# Patient Record
Sex: MALE | Race: WHITE | Employment: FULL TIME | URBAN - NONMETROPOLITAN AREA
[De-identification: names, ages, dates, MRNs, and addresses within clinical notes are randomized per-mention and may not be internally consistent; named-entity substitution may affect disease eponyms.]

---

## 2021-03-02 ENCOUNTER — OFFICE VISIT (OUTPATIENT)
Dept: FAMILY MEDICINE CLINIC | Age: 46
End: 2021-03-02
Payer: MEDICAID

## 2021-03-02 VITALS
DIASTOLIC BLOOD PRESSURE: 78 MMHG | SYSTOLIC BLOOD PRESSURE: 125 MMHG | HEART RATE: 70 BPM | TEMPERATURE: 97.6 F | OXYGEN SATURATION: 98 % | WEIGHT: 240 LBS

## 2021-03-02 DIAGNOSIS — R11.0 NAUSEA: Primary | ICD-10-CM

## 2021-03-02 DIAGNOSIS — J34.89 RHINORRHEA: ICD-10-CM

## 2021-03-02 DIAGNOSIS — R68.83 CHILLS: ICD-10-CM

## 2021-03-02 DIAGNOSIS — R51.9 ACUTE NONINTRACTABLE HEADACHE, UNSPECIFIED HEADACHE TYPE: ICD-10-CM

## 2021-03-02 DIAGNOSIS — R52 GENERALIZED BODY ACHES: ICD-10-CM

## 2021-03-02 DIAGNOSIS — R53.83 FATIGUE, UNSPECIFIED TYPE: ICD-10-CM

## 2021-03-02 PROCEDURE — 4004F PT TOBACCO SCREEN RCVD TLK: CPT | Performed by: NURSE PRACTITIONER

## 2021-03-02 PROCEDURE — 99213 OFFICE O/P EST LOW 20 MIN: CPT | Performed by: NURSE PRACTITIONER

## 2021-03-02 PROCEDURE — G8427 DOCREV CUR MEDS BY ELIG CLIN: HCPCS | Performed by: NURSE PRACTITIONER

## 2021-03-02 PROCEDURE — G8421 BMI NOT CALCULATED: HCPCS | Performed by: NURSE PRACTITIONER

## 2021-03-02 PROCEDURE — G8484 FLU IMMUNIZE NO ADMIN: HCPCS | Performed by: NURSE PRACTITIONER

## 2021-03-02 RX ORDER — MIRTAZAPINE 15 MG/1
TABLET, FILM COATED ORAL
COMMUNITY
Start: 2021-02-04 | End: 2021-06-23

## 2021-03-02 RX ORDER — ONDANSETRON 4 MG/1
4 TABLET, ORALLY DISINTEGRATING ORAL 3 TIMES DAILY PRN
Qty: 21 TABLET | Refills: 0 | Status: SHIPPED
Start: 2021-03-02 | End: 2021-06-23

## 2021-03-02 RX ORDER — ERGOCALCIFEROL 1.25 MG/1
CAPSULE ORAL
COMMUNITY
Start: 2021-02-09 | End: 2021-06-23

## 2021-03-02 SDOH — HEALTH STABILITY: MENTAL HEALTH: HOW OFTEN DO YOU HAVE A DRINK CONTAINING ALCOHOL?: NEVER

## 2021-03-02 SDOH — HEALTH STABILITY: MENTAL HEALTH: HOW MANY STANDARD DRINKS CONTAINING ALCOHOL DO YOU HAVE ON A TYPICAL DAY?: NOT ASKED

## 2021-03-02 NOTE — PROGRESS NOTES
Chief Complaint   Nausea (today )      History of Present Illness   Source of history provided by:  patient. Isi Almonte is a 54 y.o. old male who presents to the walk-in clinic with complaints of Chills, Generalized Body Aches, Headache, Rhinorrhea, Nasal Congestion, Post nasal drip, Nausea and Fatigue x 1 days. States symptoms have stayed the same since onset. Has been taking no OTC medications. Denies any Fever, Cough, Shortness of breath, Vomiting, Chest Pain, LE Edema, Abdominal Pain or Rash. Denies any hx of asthma, COPD, emphysema or pneumonia. ROS   Pertinent positives and negatives are stated within HPI, all other systems reviewed and are negative. Past Medical History:  has no past medical history on file. Past Surgical History:  has no past surgical history on file. Social History:  reports that he has been smoking. He has never used smokeless tobacco. He reports previous alcohol use. He reports current drug use. Drug: Marijuana. Family History: family history is not on file. Allergies: Penicillins    Physical Exam   Vital Signs:  /78   Pulse 70   Temp 97.6 °F (36.4 °C) (Temporal)   Wt 240 lb (108.9 kg)   SpO2 98%    Oxygen Saturation Interpretation: Normal.    Constitutional:  Alert, development consistent with age. NAD. Head:  NC/NT. Airway patent. Ears: TMs clear bilaterally. Canals without exudate or swelling bilaterally. Mouth: Posterior pharynx with mild erythema and clear postnasal drip. No tonsillar hypertrophy or exudate. Neck:  Normal ROM. Supple. No anterior cervical adenopathy noted. Lungs: CTAB without wheezes, rales, or rhonchi. CV:  Regular rate and rhythm, normal heart sounds, without pathological murmurs, ectopy, gallops, or rubs. Skin:  Normal turgor. Warm, dry, without visible rash. Lymphatic: No lymphangitis or adenopathy noted. Neurological:  Oriented. Motor functions intact.     Lab / Imaging Results   (All laboratory and radiology results have been personally reviewed by myself)  Labs:  No results found for this visit on 03/02/21. Imaging: All Radiology results interpreted by Radiologist unless otherwise noted. No results found. Medical Decision Making   Pt non-toxic, in no apparent distress and stable at time of discharge. Assessment/Plan   Brigida Lu was seen today for nausea. Diagnoses and all orders for this visit:    Nausea  -     ondansetron (ZOFRAN-ODT) 4 MG disintegrating tablet; Take 1 tablet by mouth 3 times daily as needed for Nausea or Vomiting    Chills    Generalized body aches    Acute nonintractable headache, unspecified headache type    Rhinorrhea    Fatigue, unspecified type        Work excuse provided to patient today to return to work on 3/4/2021. Increase fluids and rest. Symptomatic relief discussed including Tylenol prn pain/fever. Schedule f/u with PCP in 7-10 days if symptoms persist. ED sooner if symptoms worsen or change. ED immediately with high or refractory fever, progressive SOB, dyspnea, CP, calf pain/swelling, shaking chills, vomiting, abdominal pain, lethargy, flank pain, or decreased urinary output. Pt verbalizes understanding and is in agreement with plan of care. All questions answered. Arbie Holstein, APRN - NP    This visit was provided as a focused evaluation during the [place] -19 pandemic/national emergency. A comprehensive review of all previous patient history and testing was not conducted. Pertinent findings were elicited during the visit. *NOTE: This report was transcribed using voice recognition software. Every effort was made to ensure accuracy; however, inadvertent computerized transcription errors may be present.

## 2021-03-02 NOTE — LETTER
[address]  Phone: [phone]  Fax: [phone]    ADRIA Ríos NP        March 2, 2021     Patient: Diana Cedillo   YOB: 1975   Date of Visit: 3/2/2021       To Whom It May Concern: It is my medical opinion that Diana Cedillo may return to work on 03/04/2021. If you have any questions or concerns, please don't hesitate to call.     Sincerely,        ADRIA Ríos NP

## 2021-06-23 ENCOUNTER — OFFICE VISIT (OUTPATIENT)
Dept: FAMILY MEDICINE CLINIC | Age: 46
End: 2021-06-23
Payer: MEDICAID

## 2021-06-23 VITALS
SYSTOLIC BLOOD PRESSURE: 140 MMHG | WEIGHT: 237 LBS | HEART RATE: 63 BPM | DIASTOLIC BLOOD PRESSURE: 80 MMHG | TEMPERATURE: 97.8 F | OXYGEN SATURATION: 98 %

## 2021-06-23 DIAGNOSIS — Z48.00 DRESSING CHANGE: ICD-10-CM

## 2021-06-23 DIAGNOSIS — Z51.89 VISIT FOR WOUND CHECK: Primary | ICD-10-CM

## 2021-06-23 PROCEDURE — 99213 OFFICE O/P EST LOW 20 MIN: CPT | Performed by: PHYSICIAN ASSISTANT

## 2021-06-23 PROCEDURE — G8427 DOCREV CUR MEDS BY ELIG CLIN: HCPCS | Performed by: PHYSICIAN ASSISTANT

## 2021-06-23 PROCEDURE — G8421 BMI NOT CALCULATED: HCPCS | Performed by: PHYSICIAN ASSISTANT

## 2021-06-23 PROCEDURE — 4004F PT TOBACCO SCREEN RCVD TLK: CPT | Performed by: PHYSICIAN ASSISTANT

## 2021-06-23 ASSESSMENT — ENCOUNTER SYMPTOMS
NAUSEA: 0
DIARRHEA: 0
SHORTNESS OF BREATH: 0
BACK PAIN: 0
ABDOMINAL PAIN: 0
VOMITING: 0
PHOTOPHOBIA: 0
COUGH: 0
SORE THROAT: 0

## 2021-06-23 NOTE — PROGRESS NOTES
21  Cherise Rollins : 1975 Sex: male  Age 54 y.o. Subjective:  Chief Complaint   Patient presents with    Wound Infection     stitches on  infected middle left          70-year-old male presents to the walk-in clinic for a wound check. Patient was at work on 2021 when he cut his left middle finger with a sharp knife. Patient went to [facility] and filed for a [facility]. First report of injury was filled out there. Patient had stitches placed to the distal tip of his left finger. Patient was told to return to work on 2021. Patient states his next shift is today at 4:00 PM but he wanted to have a wound check before returning. Patient states that when they changed the bandage today they noticed some bloody drainage from the area. He was not placed on any antibiotics at the time of suture placement. He has had a tetanus within 5 years. Patient is right-hand dominant. He denies any fever, chills, nausea or vomiting. Review of Systems   Constitutional: Negative for chills and fever. HENT: Negative for congestion, ear pain and sore throat. Eyes: Negative for photophobia and visual disturbance. Respiratory: Negative for cough and shortness of breath. Cardiovascular: Negative for chest pain. Gastrointestinal: Negative for abdominal pain, diarrhea, nausea and vomiting. Genitourinary: Negative for difficulty urinating, dysuria, frequency and urgency. Musculoskeletal: Negative for back pain, neck pain and neck stiffness. Skin: Positive for wound. Negative for rash. Neurological: Negative for dizziness, syncope, weakness, light-headedness and headaches. Hematological: Negative for adenopathy. Does not bruise/bleed easily. Psychiatric/Behavioral: Negative for agitation and confusion. All other systems reviewed and are negative. PMH:   History reviewed. No pertinent past medical history. History reviewed.  No pertinent surgical

## 2021-06-26 ENCOUNTER — HOSPITAL ENCOUNTER (EMERGENCY)
Age: 46
Discharge: HOME | End: 2021-06-26
Payer: MEDICAID

## 2021-06-26 VITALS
BODY MASS INDEX: 30.88 KG/M2 | HEART RATE: 70 BPM | DIASTOLIC BLOOD PRESSURE: 75 MMHG | HEIGHT: 72 IN | RESPIRATION RATE: 18 BRPM | OXYGEN SATURATION: 97 % | WEIGHT: 228 LBS | SYSTOLIC BLOOD PRESSURE: 136 MMHG | TEMPERATURE: 97.7 F

## 2021-06-26 DIAGNOSIS — R21 RASH AND OTHER NONSPECIFIC SKIN ERUPTION: Primary | ICD-10-CM

## 2021-06-26 PROCEDURE — 99283 EMERGENCY DEPT VISIT LOW MDM: CPT

## 2021-06-26 RX ORDER — DIAPER,BRIEF,INFANT-TODD,DISP
EACH MISCELLANEOUS
Qty: 1 TUBE | Refills: 1 | Status: SHIPPED | OUTPATIENT
Start: 2021-06-26 | End: 2021-07-03

## 2021-07-04 ENCOUNTER — APPOINTMENT (OUTPATIENT)
Dept: CT IMAGING | Age: 46
End: 2021-07-04
Payer: MEDICAID

## 2021-07-04 ENCOUNTER — HOSPITAL ENCOUNTER (EMERGENCY)
Age: 46
Discharge: HOME | End: 2021-07-04
Attending: EMERGENCY MEDICINE
Payer: MEDICAID

## 2021-07-04 VITALS
HEIGHT: 72 IN | TEMPERATURE: 97.8 F | HEART RATE: 62 BPM | OXYGEN SATURATION: 99 % | WEIGHT: 228 LBS | RESPIRATION RATE: 16 BRPM | SYSTOLIC BLOOD PRESSURE: 132 MMHG | BODY MASS INDEX: 30.88 KG/M2 | DIASTOLIC BLOOD PRESSURE: 66 MMHG

## 2021-07-04 DIAGNOSIS — R51.9 ACUTE NONINTRACTABLE HEADACHE, UNSPECIFIED HEADACHE TYPE: Primary | ICD-10-CM

## 2021-07-04 LAB
ANION GAP SERPL CALCULATED.3IONS-SCNC: 6 MMOL/L (ref 7–16)
BASOPHILS ABSOLUTE: 0.08 E9/L (ref 0–0.2)
BASOPHILS RELATIVE PERCENT: 0.8 % (ref 0–2)
BUN BLDV-MCNC: 14 MG/DL (ref 6–20)
CALCIUM SERPL-MCNC: 9 MG/DL (ref 8.6–10.2)
CHLORIDE BLD-SCNC: 103 MMOL/L (ref 98–107)
CO2: 28 MMOL/L (ref 22–29)
CREAT SERPL-MCNC: 0.8 MG/DL (ref 0.7–1.2)
EOSINOPHILS ABSOLUTE: 0.27 E9/L (ref 0.05–0.5)
EOSINOPHILS RELATIVE PERCENT: 2.7 % (ref 0–6)
GFR AFRICAN AMERICAN: >60
GFR NON-AFRICAN AMERICAN: >60 ML/MIN/1.73
GLUCOSE BLD-MCNC: 108 MG/DL (ref 74–99)
HCT VFR BLD CALC: 41.5 % (ref 37–54)
HEMOGLOBIN: 14.2 G/DL (ref 12.5–16.5)
IMMATURE GRANULOCYTES #: 0.07 E9/L
IMMATURE GRANULOCYTES %: 0.7 % (ref 0–5)
LYMPHOCYTES ABSOLUTE: 2.86 E9/L (ref 1.5–4)
LYMPHOCYTES RELATIVE PERCENT: 28.7 % (ref 20–42)
MCH RBC QN AUTO: 31.7 PG (ref 26–35)
MCHC RBC AUTO-ENTMCNC: 34.2 % (ref 32–34.5)
MCV RBC AUTO: 92.6 FL (ref 80–99.9)
MONOCYTES ABSOLUTE: 0.73 E9/L (ref 0.1–0.95)
MONOCYTES RELATIVE PERCENT: 7.3 % (ref 2–12)
NEUTROPHILS ABSOLUTE: 5.95 E9/L (ref 1.8–7.3)
NEUTROPHILS RELATIVE PERCENT: 59.8 % (ref 43–80)
PDW BLD-RTO: 12.4 FL (ref 11.5–15)
PLATELET # BLD: 192 E9/L (ref 130–450)
PMV BLD AUTO: 10.5 FL (ref 7–12)
POTASSIUM REFLEX MAGNESIUM: 4 MMOL/L (ref 3.5–5)
RBC # BLD: 4.48 E12/L (ref 3.8–5.8)
SODIUM BLD-SCNC: 137 MMOL/L (ref 132–146)
WBC # BLD: 10 E9/L (ref 4.5–11.5)

## 2021-07-04 PROCEDURE — 96374 THER/PROPH/DIAG INJ IV PUSH: CPT

## 2021-07-04 PROCEDURE — 6360000002 HC RX W HCPCS: Performed by: STUDENT IN AN ORGANIZED HEALTH CARE EDUCATION/TRAINING PROGRAM

## 2021-07-04 PROCEDURE — 70496 CT ANGIOGRAPHY HEAD: CPT

## 2021-07-04 PROCEDURE — 99284 EMERGENCY DEPT VISIT MOD MDM: CPT

## 2021-07-04 PROCEDURE — 6370000000 HC RX 637 (ALT 250 FOR IP): Performed by: STUDENT IN AN ORGANIZED HEALTH CARE EDUCATION/TRAINING PROGRAM

## 2021-07-04 PROCEDURE — 85025 COMPLETE CBC W/AUTO DIFF WBC: CPT

## 2021-07-04 PROCEDURE — 80048 BASIC METABOLIC PNL TOTAL CA: CPT

## 2021-07-04 PROCEDURE — 96375 TX/PRO/DX INJ NEW DRUG ADDON: CPT

## 2021-07-04 PROCEDURE — 2580000003 HC RX 258: Performed by: STUDENT IN AN ORGANIZED HEALTH CARE EDUCATION/TRAINING PROGRAM

## 2021-07-04 PROCEDURE — 70498 CT ANGIOGRAPHY NECK: CPT

## 2021-07-04 PROCEDURE — 6360000004 HC RX CONTRAST MEDICATION: Performed by: RADIOLOGY

## 2021-07-04 PROCEDURE — 2580000003 HC RX 258: Performed by: RADIOLOGY

## 2021-07-04 RX ORDER — SODIUM CHLORIDE 0.9 % (FLUSH) 0.9 %
10 SYRINGE (ML) INJECTION PRN
Status: COMPLETED | OUTPATIENT
Start: 2021-07-04 | End: 2021-07-04

## 2021-07-04 RX ORDER — DIPHENHYDRAMINE HYDROCHLORIDE 50 MG/ML
25 INJECTION INTRAMUSCULAR; INTRAVENOUS ONCE
Status: COMPLETED | OUTPATIENT
Start: 2021-07-04 | End: 2021-07-04

## 2021-07-04 RX ORDER — KETOROLAC TROMETHAMINE 30 MG/ML
15 INJECTION, SOLUTION INTRAMUSCULAR; INTRAVENOUS ONCE
Status: COMPLETED | OUTPATIENT
Start: 2021-07-04 | End: 2021-07-04

## 2021-07-04 RX ORDER — ACETAMINOPHEN 325 MG/1
650 TABLET ORAL ONCE
Status: COMPLETED | OUTPATIENT
Start: 2021-07-04 | End: 2021-07-04

## 2021-07-04 RX ORDER — FENTANYL CITRATE 50 UG/ML
25 INJECTION, SOLUTION INTRAMUSCULAR; INTRAVENOUS ONCE
Status: COMPLETED | OUTPATIENT
Start: 2021-07-04 | End: 2021-07-04

## 2021-07-04 RX ORDER — 0.9 % SODIUM CHLORIDE 0.9 %
1000 INTRAVENOUS SOLUTION INTRAVENOUS ONCE
Status: COMPLETED | OUTPATIENT
Start: 2021-07-04 | End: 2021-07-04

## 2021-07-04 RX ORDER — METOCLOPRAMIDE HYDROCHLORIDE 5 MG/ML
10 INJECTION INTRAMUSCULAR; INTRAVENOUS ONCE
Status: COMPLETED | OUTPATIENT
Start: 2021-07-04 | End: 2021-07-04

## 2021-07-04 RX ADMIN — Medication 10 ML: at 16:37

## 2021-07-04 RX ADMIN — METOCLOPRAMIDE 10 MG: 5 INJECTION, SOLUTION INTRAMUSCULAR; INTRAVENOUS at 15:52

## 2021-07-04 RX ADMIN — FENTANYL CITRATE 25 MCG: 50 INJECTION, SOLUTION INTRAMUSCULAR; INTRAVENOUS at 15:52

## 2021-07-04 RX ADMIN — IOPAMIDOL 75 ML: 755 INJECTION, SOLUTION INTRAVENOUS at 16:42

## 2021-07-04 RX ADMIN — SODIUM CHLORIDE 1000 ML: 9 INJECTION, SOLUTION INTRAVENOUS at 15:51

## 2021-07-04 RX ADMIN — ACETAMINOPHEN 650 MG: 325 TABLET ORAL at 15:52

## 2021-07-04 RX ADMIN — KETOROLAC TROMETHAMINE 15 MG: 30 INJECTION, SOLUTION INTRAMUSCULAR; INTRAVENOUS at 18:12

## 2021-07-04 RX ADMIN — DIPHENHYDRAMINE HYDROCHLORIDE 25 MG: 50 INJECTION INTRAMUSCULAR; INTRAVENOUS at 15:52

## 2021-07-04 ASSESSMENT — PAIN - FUNCTIONAL ASSESSMENT: PAIN_FUNCTIONAL_ASSESSMENT: PREVENTS OR INTERFERES SOME ACTIVE ACTIVITIES AND ADLS

## 2021-07-04 ASSESSMENT — PAIN DESCRIPTION - LOCATION
LOCATION: HEAD
LOCATION: HEAD

## 2021-07-04 ASSESSMENT — ENCOUNTER SYMPTOMS
COUGH: 0
VOMITING: 0
APNEA: 0
TROUBLE SWALLOWING: 0
SORE THROAT: 0
WHEEZING: 0
SHORTNESS OF BREATH: 0
RHINORRHEA: 0
DIARRHEA: 0
EYE PAIN: 0
BACK PAIN: 0
NAUSEA: 0
CONSTIPATION: 0
EYE REDNESS: 0
ABDOMINAL PAIN: 0
CHEST TIGHTNESS: 0
PHOTOPHOBIA: 0

## 2021-07-04 ASSESSMENT — PAIN DESCRIPTION - DESCRIPTORS: DESCRIPTORS: SHARP

## 2021-07-04 ASSESSMENT — PAIN DESCRIPTION - FREQUENCY: FREQUENCY: INTERMITTENT

## 2021-07-04 ASSESSMENT — PAIN SCALES - GENERAL
PAINLEVEL_OUTOF10: 9
PAINLEVEL_OUTOF10: 5
PAINLEVEL_OUTOF10: 9
PAINLEVEL_OUTOF10: 5

## 2021-07-04 ASSESSMENT — PAIN DESCRIPTION - ONSET: ONSET: SUDDEN

## 2021-07-04 ASSESSMENT — PAIN DESCRIPTION - PAIN TYPE
TYPE: ACUTE PAIN
TYPE: ACUTE PAIN

## 2021-07-04 NOTE — ED PROVIDER NOTES
[facility] ENCOUNTER      Pt Name: Loretta Asher  MRN: [id number]  [place] 1975  Date of evaluation: 7/4/2021      CHIEF COMPLAINT       Chief Complaint   Patient presents with    Headache     onset last night after shower        HPI  Loretta Asher is a 51 y.o. male who presents to the emergency department with complaints of headache. The patient states that last night at around 3 AM he was taking shower he began to have a headache. He does not think he hit his head. Describes the symptoms as mild to moderate. His wife states she feels the mild amount of swelling in the back of his head. He states he slept and when he woke up continue to have a headache. He denies any neck pain, vision changes, weakness numbness or tingling. Is never had a headache like this before. Denies any blood thinners. Denies any fevers, chills, neck pain, shortness of breath or chest pain. Except as noted above the remainder of the review of systems was reviewed and negative. Review of Systems   Constitutional: Negative for activity change, chills, diaphoresis, fatigue and fever. HENT: Negative for rhinorrhea, sore throat and trouble swallowing. Eyes: Negative for photophobia, pain and redness. Respiratory: Negative for apnea, cough, chest tightness, shortness of breath and wheezing. Cardiovascular: Negative for chest pain, palpitations and leg swelling. Gastrointestinal: Negative for abdominal pain, constipation, diarrhea, nausea and vomiting. Endocrine: Negative for polyuria. Genitourinary: Negative for difficulty urinating and dysuria. Musculoskeletal: Negative for back pain, neck pain and neck stiffness. Skin: Negative for pallor and rash. Neurological: Positive for headaches. Negative for dizziness, syncope, weakness, light-headedness and numbness. Psychiatric/Behavioral: Negative for confusion.  The patient is not nervous/anxious. Physical Exam  Vitals and nursing note reviewed. Constitutional:       General: He is not in acute distress. Appearance: He is well-developed. Comments: Awake and alert. Sitting in the gurney in no obvious distress. HENT:      Head: Normocephalic. Comments: Mild swelling to the occiput. Head otherwise atraumatic. Mouth/Throat:      Mouth: Mucous membranes are moist.      Pharynx: No oropharyngeal exudate. Eyes:      General: No scleral icterus. Pupils: Pupils are equal, round, and reactive to light. Cardiovascular:      Rate and Rhythm: Normal rate and regular rhythm. Heart sounds: Normal heart sounds. No murmur heard. Comments: 2+ radial and dorsal pedis pulses bilaterally  Pulmonary:      Effort: Pulmonary effort is normal. No respiratory distress. Breath sounds: Normal breath sounds. No wheezing. Abdominal:      Palpations: Abdomen is soft. Tenderness: There is no abdominal tenderness. There is no guarding or rebound. Musculoskeletal:         General: No tenderness or deformity. Normal range of motion. Cervical back: Normal range of motion and neck supple. Right lower leg: No edema. Left lower leg: No edema. Skin:     General: Skin is warm and dry. Capillary Refill: Capillary refill takes less than 2 seconds. Findings: No rash. Neurological:      General: No focal deficit present. Mental Status: He is alert and oriented to person, place, and time. Cranial Nerves: No cranial nerve deficit. Sensory: No sensory deficit. Motor: No weakness or abnormal muscle tone. Psychiatric:         Mood and Affect: Mood normal.         Behavior: Behavior normal.          Procedures     MDM   This is a 79-year-old male who presents to the emergency department with chief complaint of headache.   In the emergency department the patient is awake and alert, hemodynamically stable, afebrile and in no respiratory distress. Ambulating without difficulty. Neurologic exam shows no focal deficits. CT imaging, CTA head and neck showed no acute intracranial pathology. No signs of subarachnoid hemorrhage. Patient feeling much better after his part of therapy in the ED. No meningeal signs or signs of meningitis. Patient does have a small hematoma to possible region tender to palpation reproducing his pain. Unclear if he had contusion to the head because patient does not remember if he did in fact hit his head. No other concerning signs or symptoms. Patient feeling much better ambulating without difficulty. Discussed plan for discharge home as well as return precautions patient understands and agrees to the plan.                    --------------------------------------------- PAST HISTORY ---------------------------------------------  Past Medical History:  has no past medical history on file. Past Surgical History:  has no past surgical history on file. Social History:  reports that he has been smoking. He has never used smokeless tobacco. He reports previous alcohol use. He reports current drug use. Drug: Marijuana. Family History: family history is not on file. The patients home medications have been reviewed.     Allergies: Penicillins    -------------------------------------------------- RESULTS -------------------------------------------------  Labs:  Results for orders placed or performed during the hospital encounter of 07/04/21   CBC Auto Differential   Result Value Ref Range    WBC 10.0 4.5 - 11.5 E9/L    RBC 4.48 3.80 - 5.80 E12/L    Hemoglobin 14.2 12.5 - 16.5 g/dL    Hematocrit 41.5 37.0 - 54.0 %    MCV 92.6 80.0 - 99.9 fL    MCH 31.7 26.0 - 35.0 pg    MCHC 34.2 32.0 - 34.5 %    RDW 12.4 11.5 - 15.0 fL    Platelets 782 610 - 062 E9/L    MPV 10.5 7.0 - 12.0 fL    Neutrophils % 59.8 43.0 - 80.0 %    Immature Granulocytes % 0.7 0.0 - 5.0 %    Lymphocytes % 28.7 20.0 - 42.0 % ---------------------------  Date / Time Roomed:  7/4/2021  3:18 PM  ED Bed Assignment:  12/12    The nursing notes within the ED encounter and vital signs as below have been reviewed. /66   Pulse 62   Temp 97.8 °F (36.6 °C) (Temporal)   Resp 16   Ht 6' (1.829 m)   Wt 228 lb (103.4 kg)   SpO2 99%   BMI 30.92 kg/m²   Oxygen Saturation Interpretation: Normal      ------------------------------------------ PROGRESS NOTES ------------------------------------------    I have spoken with the patient and discussed todays results, in addition to providing specific details for the plan of care and counseling regarding the diagnosis and prognosis. Their questions are answered at this time and they are agreeable with the plan. I discussed at length with them reasons for immediate return here for re evaluation. They will followup with their primary care physician by calling their office tomorrow. --------------------------------- ADDITIONAL PROVIDER NOTES ---------------------------------  At this time the patient is without objective evidence of an acute process requiring hospitalization or inpatient management. They have remained hemodynamically stable throughout their entire ED visit and are stable for discharge with outpatient follow-up. The plan has been discussed in detail and they are aware of the specific conditions for emergent return, as well as the importance of follow-up. There are no discharge medications for this patient. Diagnosis:  1. Acute nonintractable headache, unspecified headache type        Disposition:  Patient's disposition: Discharge to home  Patient's condition is stable.        Patric Dance, DO  Resident  07/04/21 2011

## 2021-07-06 ENCOUNTER — OFFICE VISIT (OUTPATIENT)
Dept: FAMILY MEDICINE CLINIC | Age: 46
End: 2021-07-06
Payer: MEDICAID

## 2021-07-06 VITALS
HEART RATE: 83 BPM | SYSTOLIC BLOOD PRESSURE: 128 MMHG | WEIGHT: 238 LBS | TEMPERATURE: 97.7 F | BODY MASS INDEX: 32.28 KG/M2 | OXYGEN SATURATION: 98 % | DIASTOLIC BLOOD PRESSURE: 80 MMHG

## 2021-07-06 DIAGNOSIS — Z48.02 VISIT FOR SUTURE REMOVAL: Primary | ICD-10-CM

## 2021-07-06 PROCEDURE — 4004F PT TOBACCO SCREEN RCVD TLK: CPT | Performed by: PHYSICIAN ASSISTANT

## 2021-07-06 PROCEDURE — G8419 CALC BMI OUT NRM PARAM NOF/U: HCPCS | Performed by: PHYSICIAN ASSISTANT

## 2021-07-06 PROCEDURE — G8427 DOCREV CUR MEDS BY ELIG CLIN: HCPCS | Performed by: PHYSICIAN ASSISTANT

## 2021-07-06 PROCEDURE — 99213 OFFICE O/P EST LOW 20 MIN: CPT | Performed by: PHYSICIAN ASSISTANT

## 2021-07-06 ASSESSMENT — ENCOUNTER SYMPTOMS
DIARRHEA: 0
BACK PAIN: 0
COUGH: 0
VOMITING: 0
ROS SKIN COMMENTS: SUTURE REMOVAL
PHOTOPHOBIA: 0
SHORTNESS OF BREATH: 0
SORE THROAT: 0
ABDOMINAL PAIN: 0
NAUSEA: 0

## 2021-07-06 NOTE — PROGRESS NOTES
21  Radha Womack : 1975 Sex: male  Age 51 y.o. Subjective:  Chief Complaint   Patient presents with    Suture / Staple Removal     middle left finger          79-year-old male presents to the walk-in clinic for suture removal.  Patient had 4 simple interrupted sutures placed to the distal tip of the left middle finger on 2020 after sustaining an injury with a knife at work. Patient reports no redness or discharge or drainage from the area. Review of Systems   Constitutional: Negative for chills and fever. HENT: Negative for congestion, ear pain and sore throat. Eyes: Negative for photophobia and visual disturbance. Respiratory: Negative for cough and shortness of breath. Cardiovascular: Negative for chest pain. Gastrointestinal: Negative for abdominal pain, diarrhea, nausea and vomiting. Genitourinary: Negative for difficulty urinating, dysuria, frequency and urgency. Musculoskeletal: Negative for back pain, neck pain and neck stiffness. Skin: Negative for rash. Suture removal   Neurological: Negative for dizziness, syncope, weakness, light-headedness and headaches. Hematological: Negative for adenopathy. Does not bruise/bleed easily. Psychiatric/Behavioral: Negative for agitation and confusion. All other systems reviewed and are negative. PMH:   History reviewed. No pertinent past medical history. History reviewed. No pertinent surgical history. History reviewed. No pertinent family history. Medications:   No current outpatient medications on file. Allergies: Allergies   Allergen Reactions    Penicillins Rash       Social History:     Social History     Tobacco Use    Smoking status: Current Every Day Smoker    Smokeless tobacco: Never Used   Vaping Use    Vaping Use: Never used   Substance Use Topics    Alcohol use: Not Currently    Drug use: Yes     Types: Marijuana       Patient lives at home.     Physical Exam: They understand plan agreeable. Clinical Impression:   There are no diagnoses linked to this encounter. The patient is to call for any concerns or return if any of the signs or symptoms worsen. The patient is to follow-up with PCP in the next 2-3 days for repeat evaluation repeat assessment or go directly to the emergency department. SIGNATURE: Meseret Carmona PA-C

## 2021-07-13 ENCOUNTER — OFFICE VISIT (OUTPATIENT)
Dept: FAMILY MEDICINE CLINIC | Age: 46
End: 2021-07-13
Payer: MEDICAID

## 2021-07-13 VITALS
OXYGEN SATURATION: 97 % | HEIGHT: 72 IN | WEIGHT: 237 LBS | HEART RATE: 93 BPM | TEMPERATURE: 97.9 F | BODY MASS INDEX: 32.1 KG/M2

## 2021-07-13 DIAGNOSIS — Z51.89 VISIT FOR WOUND CHECK: Primary | ICD-10-CM

## 2021-07-13 PROCEDURE — 4004F PT TOBACCO SCREEN RCVD TLK: CPT | Performed by: PHYSICIAN ASSISTANT

## 2021-07-13 PROCEDURE — 99213 OFFICE O/P EST LOW 20 MIN: CPT | Performed by: PHYSICIAN ASSISTANT

## 2021-07-13 PROCEDURE — G8417 CALC BMI ABV UP PARAM F/U: HCPCS | Performed by: PHYSICIAN ASSISTANT

## 2021-07-13 PROCEDURE — G8427 DOCREV CUR MEDS BY ELIG CLIN: HCPCS | Performed by: PHYSICIAN ASSISTANT

## 2021-07-13 ASSESSMENT — ENCOUNTER SYMPTOMS
COUGH: 0
SHORTNESS OF BREATH: 0
SORE THROAT: 0
VOMITING: 0
DIARRHEA: 0
PHOTOPHOBIA: 0
NAUSEA: 0
BACK PAIN: 0
ABDOMINAL PAIN: 0

## 2021-07-13 NOTE — PROGRESS NOTES
21  Lord Cleveland : 1975 Sex: male  Age 51 y.o. Subjective:  Chief Complaint   Patient presents with    Finger Injury     had sutures removed last Tuesday and says it is opening up again         60-year-old male presents to the walk-in clinic for a wound check. Patient had sutures removed last week from his left middle finger. He is concerned because it seems as though the area is opening back up again. He denies any redness or discharge or drainage from the area. He is continuing wound care specifically while at work. Review of Systems   Constitutional: Negative for chills and fever. HENT: Negative for congestion, ear pain and sore throat. Eyes: Negative for photophobia and visual disturbance. Respiratory: Negative for cough and shortness of breath. Cardiovascular: Negative for chest pain. Gastrointestinal: Negative for abdominal pain, diarrhea, nausea and vomiting. Genitourinary: Negative for difficulty urinating, dysuria, frequency and urgency. Musculoskeletal: Negative for back pain, neck pain and neck stiffness. Skin: Positive for wound. Negative for rash. Neurological: Negative for dizziness, syncope, weakness, light-headedness and headaches. Hematological: Negative for adenopathy. Does not bruise/bleed easily. Psychiatric/Behavioral: Negative for agitation and confusion. All other systems reviewed and are negative. PMH:   History reviewed. No pertinent past medical history. History reviewed. No pertinent surgical history. History reviewed. No pertinent family history. Medications:   No current outpatient medications on file. Allergies: Allergies   Allergen Reactions    Penicillins Rash       Social History:     Social History     Tobacco Use    Smoking status: Current Every Day Smoker    Smokeless tobacco: Never Used   Vaping Use    Vaping Use: Never used   Substance Use Topics    Alcohol use: Not Currently    Drug use:  Yes Types: Marijuana       Patient lives at home. Physical Exam:     Vitals:    07/13/21 1205   Pulse: 93   Temp: 97.9 °F (36.6 °C)   SpO2: 97%   Weight: 237 lb (107.5 kg)   Height: 6' (1.829 m)       Exam:  Physical Exam  Vitals and nursing note reviewed. Constitutional:       General: He is not in acute distress. Appearance: He is well-developed. HENT:      Head: Normocephalic and atraumatic. Eyes:      Conjunctiva/sclera: Conjunctivae normal.      Pupils: Pupils are equal, round, and reactive to light. Cardiovascular:      Rate and Rhythm: Normal rate and regular rhythm. Pulmonary:      Effort: Pulmonary effort is normal. No respiratory distress. Breath sounds: Normal breath sounds. Abdominal:      General: Bowel sounds are normal.      Palpations: Abdomen is soft. Tenderness: There is no abdominal tenderness. Musculoskeletal:         General: Normal range of motion. Cervical back: Normal range of motion. Skin:     General: Skin is warm and dry. Comments: The laceration has had some wound dehiscence but there is no discharge or drainage. There is no bleeding. No erythema. There is no evidence of infection. Neurological:      Mental Status: He is alert and oriented to person, place, and time. Psychiatric:         Behavior: Behavior normal.         Thought Content: Thought content normal.         Judgment: Judgment normal.           Testing:           Medical Decision Making:       Patient upon arrival did not appear toxic or lethargic. Vital signs were reviewed. Past medical history reviewed. Allergies reviewed. Medications reviewed. Patient is presenting with the above complaint of wound check. Patient was given reassurance. He was instructed to continue proper wound care and monitor for signs or symptoms of infection and return if any occur. Patient understands the plan is agreeable.     Clinical Impression:   Shaheen Molina was seen today for

## 2021-11-19 ENCOUNTER — OFFICE VISIT (OUTPATIENT)
Dept: FAMILY MEDICINE CLINIC | Age: 46
End: 2021-11-19
Payer: MEDICAID

## 2021-11-19 VITALS
BODY MASS INDEX: 32.28 KG/M2 | DIASTOLIC BLOOD PRESSURE: 76 MMHG | TEMPERATURE: 97.1 F | WEIGHT: 238 LBS | HEART RATE: 73 BPM | RESPIRATION RATE: 16 BRPM | SYSTOLIC BLOOD PRESSURE: 130 MMHG | OXYGEN SATURATION: 97 %

## 2021-11-19 DIAGNOSIS — B34.9 VIRAL ILLNESS: Primary | ICD-10-CM

## 2021-11-19 DIAGNOSIS — R50.9 FEVER, UNSPECIFIED FEVER CAUSE: ICD-10-CM

## 2021-11-19 LAB
INFLUENZA A ANTIBODY: NEGATIVE
INFLUENZA B ANTIBODY: NEGATIVE
Lab: NORMAL
PERFORMING INSTRUMENT: NORMAL
QC PASS/FAIL: NORMAL
SARS-COV-2, POC: NORMAL

## 2021-11-19 PROCEDURE — 87804 INFLUENZA ASSAY W/OPTIC: CPT | Performed by: NURSE PRACTITIONER

## 2021-11-19 PROCEDURE — G8427 DOCREV CUR MEDS BY ELIG CLIN: HCPCS | Performed by: NURSE PRACTITIONER

## 2021-11-19 PROCEDURE — G8417 CALC BMI ABV UP PARAM F/U: HCPCS | Performed by: NURSE PRACTITIONER

## 2021-11-19 PROCEDURE — 4004F PT TOBACCO SCREEN RCVD TLK: CPT | Performed by: NURSE PRACTITIONER

## 2021-11-19 PROCEDURE — 87426 SARSCOV CORONAVIRUS AG IA: CPT | Performed by: NURSE PRACTITIONER

## 2021-11-19 PROCEDURE — G8484 FLU IMMUNIZE NO ADMIN: HCPCS | Performed by: NURSE PRACTITIONER

## 2021-11-19 PROCEDURE — 99213 OFFICE O/P EST LOW 20 MIN: CPT | Performed by: NURSE PRACTITIONER

## 2021-11-19 NOTE — PROGRESS NOTES
Chief Complaint   Fatigue, Nausea (symptoms started yesterday), Generalized Body Aches, Chills, and Sweats      History of Present Illness   Source of history provided by: patient. Philly Lobato is a 51 y.o. old male who presents to walk-in care for evaluation of fatigue X 2 days. Associated symptoms include cough, body aches, chills and fever . Since onset symptoms have been about the same. They have not been diagnosed with COVID-19 in the past 90 days. The patient is not vaccinated. Has taken nothing at home with some symptomatic relief. Denies any fever, chills, CP, dyspnea, LE edema, abdominal pain, vomiting, rash, or lethargy. Denies any hx of asthma, recurrent bronchitis, COPD, or pneumonia. Has a history of tobacco abuse, current every day. ROS   Pertinent positives and negatives are stated within HPI, all other systems reviewed and are negative. Past Medical History:  has no past medical history on file. Surgical History:  has no past surgical history on file. Social History:  reports that he has been smoking. He has never used smokeless tobacco. He reports previous alcohol use. He reports current drug use. Drug: Marijuana Lazarus Blow). Family History: family history is not on file. Allergies: Penicillins    Physical Exam      VS:  /76   Pulse 73   Temp 97.1 °F (36.2 °C)   Resp 16   Wt 238 lb (108 kg)   SpO2 97%   BMI 32.28 kg/m²    Oxygen Saturation Interpretation: Normal.    Constitutional:  Alert, development consistent with age. NAD. Head:  NC/NT. Airway patent. Moderate TTP over maxillary and frontal sinuses. Ears: Bilateral external auditory ear canals are clear there is no cerumen, erythema, debris present. Bilateral tympanic membranes intact, translucent, normal cone of light. Nose: Bilateral turbinates swollen. No septal deviation. Rhinorrhea present. Mouth: Posterior pharynx with mild erythema and clear postnasal drip. No tonsillar hypertrophy or exudate.    Neck: Normal ROM. Supple. No anterior cervical adenopathy noted. Lungs: CTAB without wheezes, rales, or rhonchi. CV:  Regular rate and rhythm, normal heart sounds, without pathological murmurs, ectopy, gallops, or rubs. Skin:  Normal turgor. Warm, dry, without visible rash. Lymphatic: No lymphangitis or adenopathy noted. Neurological:  Oriented. Motor functions intact. Lab / Imaging Results   (All laboratory and radiology results have been personally reviewed by myself)  Labs:  Results for orders placed or performed in visit on 11/19/21   POCT COVID-19, Antigen   Result Value Ref Range    SARS-COV-2, POC Not-Detected Not Detected    Lot Number pass     QC Pass/Fail [id number]     Performing Instrument Double R Group    POCT Influenza A/B   Result Value Ref Range    Influenza A Ab negative     Influenza B Ab negative        Imaging: All Radiology results interpreted by Radiologist unless otherwise noted. No results found. Medical Decision Making   Pt non-toxic, in no apparent distress and stable at time of discharge. Assessment/Plan   Mai Loco was seen today for fatigue, nausea, generalized body aches, chills and sweats. Diagnoses and all orders for this visit:    Viral illness    Fever, unspecified fever cause  -     POCT COVID-19, Antigen  -     POCT Influenza A/B      Rapid Covid testing and influenza in office is negative. I did advise him about the possibility of false negatives with testing too soon and rapid testing. He does decline PCR testing. Regardless of testing results, pt should also be fever free for 24 hours and symptoms should be improved overall prior to returning. Increase fluids and rest.   Other symptomatic relief discussed including Tylenol prn pain/fever. Schedule virtual f/u with PCP in 7-10 days if symptoms persist.  Go to ED sooner if symptoms worsen or change.  ED immediately with high or refractory fever, progressive SOB, dyspnea, CP, calf pain/swelling, shaking chills, vomiting, abdominal pain, lethargy, flank pain, or decreased urinary output. Pt verbalizes understanding and is in agreement with plan of care. All questions answered. ADRIA White NP    *NOTE: This report was transcribed using voice recognition software. Every effort was made to ensure accuracy; however, inadvertent computerized transcription errors may be present.

## 2021-12-03 ENCOUNTER — OFFICE VISIT (OUTPATIENT)
Dept: FAMILY MEDICINE CLINIC | Age: 46
End: 2021-12-03
Payer: MEDICAID

## 2021-12-03 VITALS
HEIGHT: 72 IN | BODY MASS INDEX: 32.1 KG/M2 | OXYGEN SATURATION: 98 % | SYSTOLIC BLOOD PRESSURE: 138 MMHG | DIASTOLIC BLOOD PRESSURE: 78 MMHG | HEART RATE: 73 BPM | WEIGHT: 237 LBS | RESPIRATION RATE: 16 BRPM

## 2021-12-03 DIAGNOSIS — G89.29 CHRONIC LEFT-SIDED LOW BACK PAIN WITHOUT SCIATICA: ICD-10-CM

## 2021-12-03 DIAGNOSIS — M54.50 CHRONIC LEFT-SIDED LOW BACK PAIN WITHOUT SCIATICA: ICD-10-CM

## 2021-12-03 DIAGNOSIS — U07.1 COVID-19: Primary | ICD-10-CM

## 2021-12-03 DIAGNOSIS — R19.7 DIARRHEA, UNSPECIFIED TYPE: ICD-10-CM

## 2021-12-03 LAB
BILIRUBIN, POC: NEGATIVE
BLOOD URINE, POC: NORMAL
CLARITY, POC: CLEAR
COLOR, POC: CLEAR
GLUCOSE URINE, POC: NEGATIVE
KETONES, POC: NEGATIVE
LEUKOCYTE EST, POC: NEGATIVE
Lab: ABNORMAL
NITRITE, POC: NEGATIVE
PERFORMING INSTRUMENT: ABNORMAL
PH, POC: 5
PROTEIN, POC: NEGATIVE
QC PASS/FAIL: ABNORMAL
SARS-COV-2, POC: DETECTED
SPECIFIC GRAVITY, POC: >1.03
UROBILINOGEN, POC: 0.2

## 2021-12-03 PROCEDURE — G8417 CALC BMI ABV UP PARAM F/U: HCPCS | Performed by: NURSE PRACTITIONER

## 2021-12-03 PROCEDURE — 87426 SARSCOV CORONAVIRUS AG IA: CPT | Performed by: NURSE PRACTITIONER

## 2021-12-03 PROCEDURE — G8427 DOCREV CUR MEDS BY ELIG CLIN: HCPCS | Performed by: NURSE PRACTITIONER

## 2021-12-03 PROCEDURE — G8484 FLU IMMUNIZE NO ADMIN: HCPCS | Performed by: NURSE PRACTITIONER

## 2021-12-03 PROCEDURE — 99213 OFFICE O/P EST LOW 20 MIN: CPT | Performed by: NURSE PRACTITIONER

## 2021-12-03 PROCEDURE — 4004F PT TOBACCO SCREEN RCVD TLK: CPT | Performed by: NURSE PRACTITIONER

## 2021-12-03 PROCEDURE — 81002 URINALYSIS NONAUTO W/O SCOPE: CPT | Performed by: NURSE PRACTITIONER

## 2021-12-03 NOTE — PROGRESS NOTES
Chief Complaint:       Diarrhea (patient was tested negative for covid about 2 weeks ago), Sweats, and Chills      History of Present Illness   Source of history provided by:  patient. Skyler Romero is a 51 y.o. old male who presents to walk-in care for complaints of diarrhea X 2 days. Associated symptoms include chills and abdominal pain. Since onset the symptoms have been persistent. The patient has no known consumption of contaminated food, no recent antibiotic use, and no recent international travel. The patient is tolerating fluids with a decreased appetite. The patient has no pertinent past medical gastrointestinal history or any abdominal surgeries. The symptoms are aggravated by eating and relieved by rest.  There has been NO back pain, cloudy urine, constipation, dysuria, headache, hematuria, sweating, urinary frequency, urinary incontinence or urinary urgency. ROS   Unless otherwise stated in this report or unable to obtain because of the patient's clinical or mental status as evidenced by the medical record, this patients's positive and negative responses for Review of Systems, constitutional, psych, eyes, ENT, cardiovascular, respiratory, gastrointestinal, neurological, genitourinary, musculoskeletal, integument systems and systems related to the presenting problem are either stated in the preceding or were not pertinent or were negative for the symptoms and/or complaints related to the medical problem. Past Medical History:  has no past medical history on file. Past Surgical History:  has no past surgical history on file. Social History:  reports that he has been smoking. He has never used smokeless tobacco. He reports previous alcohol use. He reports current drug use. Drug: Marijuana Charmayne Stai). Family History: family history is not on file.    Allergies: Penicillins    Physical Exam   Vital Signs:  /78   Pulse 73   Resp 16   Ht 6' (1.829 m)   Wt 237 lb (107.5 kg)   SpO2 98% BMI 32.14 kg/m²    Oxygen Saturation Interpretation: Normal.    General Appearance/Constitutional:  Alert, development consistent with age, NAD. HEENT:  NCAT. MMMP. Lungs: CTAB without wheezing, rales, or rhonchi. Heart:  RRR, no murmurs, rubs, or gallops. Abdomen:         General Appearance: No obvious trauma or bruising. No rashes or lesions. Bowel sounds: BS+x4       Distension:  No distension. Tenderness: SNT. Liver/Spleen: Non-tender and no hepatosplenomegaly. Pulsatile Mass: None noted. Back: CVA Tenderness: No bilateral tenderness or bruising. Skin:  Normal turgor. Warm, dry, without visible rash, unless noted elsewhere. Neurological:  Orientation age-appropriate. Motor functions intact. Lab / Imaging Results   (All laboratory and radiology results have been personally reviewed by myself)  Labs:  Results for orders placed or performed in visit on 12/03/21   POCT COVID-19, Antigen   Result Value Ref Range    SARS-COV-2, POC Detected (A) Not Detected    Lot Number [id number]     QC Pass/Fail pass     Performing Instrument BD Veritor    POCT Urinalysis no Micro   Result Value Ref Range    Color, UA clear     Clarity, UA clear     Glucose, UA POC negative     Bilirubin, UA negative     Ketones, UA negative     Spec Grav, UA >1.030     Blood, UA POC small     pH, UA 5.0     Protein, UA POC negative     Urobilinogen, UA 0.2     Leukocytes, UA negative     Nitrite, UA negative        Imaging: All Radiology results interpreted by Radiologist unless otherwise noted. No results found. Assessment / Plan   Impression(s):  Pablito Miguel was seen today for diarrhea, sweats and chills. Diagnoses and all orders for this visit:    COVID-19    Diarrhea, unspecified type  -     POCT COVID-19, Antigen  -     POCT COVID-19, Antigen    Chronic left-sided low back pain without sciatica  -     POCT Urinalysis no Micro  -     Culture, Urine;  Future      Rapid Covid testing in office is positive. UA in office is negative. Advised patient that he should quarantine for total of 10 days from symptom onset. In order to discontinue quarantine he needs to be fever free for 24 hours without the use of Tylenol or ibuprofen. Increase fluids and rest. Clear liquids progressing to [employer] as tolerated. Advise f/u with PCP in 3-5 days if symptoms persist. ED sooner if symptoms worsen or change. ED immediately with the development of high or refractory fever, chest pain, shortness of breath, severe or worsening abdominal pain, hematochezia, coffee-ground emesis, bloody stools, lethargy, CP, or SOB. Pt states understanding and is in agreement with this care plan. All questions answered. Return if symptoms worsen or fail to improve.     Jeet Wallace, ADRIA - NP

## 2021-12-03 NOTE — LETTER
[address] [place] [id number]  Dept: [phone]  Dept Fax: [phone]    ADRIA Falk NP      12/3/2021     Patient: Kiana Rendon   YOB: 1975       To Whom It May Concern: It is my medical opinion that Kiana Rendon should remain out of work while acutely ill. She did test positive for COVID-19 and requires 10-day isolation from symptom onset. Patient can return to work after 10 days of symptoms if there is no fever for 24 hours without fever reducing medications and improvement in symptoms. If you have any questions or concerns, please don't hesitate to call.     Sincerely,        ADRIA Falk NP

## 2021-12-03 NOTE — LETTER
[facility]  [address] [place] [address]  Phone: [phone]  Fax: [phone]    ADRIA Muñoz NP        December 3, 2021     Patient: Doug Hoff   YOB: 1975   Date of Visit: 12/3/2021       To Whom It May Concern: It is my medical opinion that Doug Hoff may return to work on 12/5/2021. If you have any questions or concerns, please don't hesitate to call.     Sincerely,        ADRIA Muñoz NP

## 2021-12-05 LAB — URINE CULTURE, ROUTINE: NORMAL

## 2022-06-02 ENCOUNTER — OFFICE VISIT (OUTPATIENT)
Dept: FAMILY MEDICINE CLINIC | Age: 47
End: 2022-06-02
Payer: MEDICAID

## 2022-06-02 VITALS
HEIGHT: 72 IN | WEIGHT: 224 LBS | HEART RATE: 73 BPM | RESPIRATION RATE: 18 BRPM | SYSTOLIC BLOOD PRESSURE: 136 MMHG | OXYGEN SATURATION: 98 % | BODY MASS INDEX: 30.34 KG/M2 | DIASTOLIC BLOOD PRESSURE: 86 MMHG | TEMPERATURE: 98.3 F

## 2022-06-02 DIAGNOSIS — D22.9 ATYPICAL MOLE: ICD-10-CM

## 2022-06-02 DIAGNOSIS — M25.561 ACUTE PAIN OF RIGHT KNEE: Primary | ICD-10-CM

## 2022-06-02 PROCEDURE — 4004F PT TOBACCO SCREEN RCVD TLK: CPT | Performed by: NURSE PRACTITIONER

## 2022-06-02 PROCEDURE — 96372 THER/PROPH/DIAG INJ SC/IM: CPT | Performed by: NURSE PRACTITIONER

## 2022-06-02 PROCEDURE — G8427 DOCREV CUR MEDS BY ELIG CLIN: HCPCS | Performed by: NURSE PRACTITIONER

## 2022-06-02 PROCEDURE — 99213 OFFICE O/P EST LOW 20 MIN: CPT | Performed by: NURSE PRACTITIONER

## 2022-06-02 PROCEDURE — G8417 CALC BMI ABV UP PARAM F/U: HCPCS | Performed by: NURSE PRACTITIONER

## 2022-06-02 RX ORDER — KETOROLAC TROMETHAMINE 30 MG/ML
30 INJECTION, SOLUTION INTRAMUSCULAR; INTRAVENOUS ONCE
Status: COMPLETED | OUTPATIENT
Start: 2022-06-02 | End: 2022-06-02

## 2022-06-02 RX ADMIN — KETOROLAC TROMETHAMINE 30 MG: 30 INJECTION, SOLUTION INTRAMUSCULAR; INTRAVENOUS at 16:13

## 2022-06-02 NOTE — PROGRESS NOTES
Chief Complaint:   Knee Pain (right- two weeks)      History of Present Illness   Source of history provided by:  patient. Timothy Douglas is a 51 y.o. old male who presents to the [facility] care for right knee pain for the past 14 days. States the pain is located over the anterior aspect and does not radiate. States the pain is progressive. Denies any known injury to the knee. Reports associated swelling and moderate pain with ROM. Pain is also exacerbated by ambulation. Denies any weakness, paresthesias, calf pain/edema, foot/ankle pain, hip pain, back pain, abrasions, fever, chills, rash, or any other symptoms. There has been a history or prior knee problems. Denies any history of previous knee surgery. Has been taking voltaren gel OTC without relief. Review of Systems    Unless otherwise stated in this report or unable to obtain because of the patient's clinical or mental status as evidenced by the medical record, this patients's positive and negative responses for Review of Systems, constitutional, psych, eyes, ENT, cardiovascular, respiratory, gastrointestinal, neurological, genitourinary, musculoskeletal, integument systems and systems related to the presenting problem are either stated in the preceding or were negative for the symptoms and/or complaints related to the medical problem.bella. Past Medical History:  has no past medical history on file. Past Surgical History:  has no past surgical history on file. Social History:  reports that he has been smoking. He has never used smokeless tobacco. He reports previous alcohol use. He reports current drug use. Drug: Marijuana Jenni Awkward). Family History: family history is not on file.   Allergies: Penicillins    Physical Exam   Vital Signs:  /86   Pulse 73   Temp 98.3 °F (36.8 °C) (Temporal)   Resp 18   Ht 6' (1.829 m)   Wt 224 lb (101.6 kg)   SpO2 98%   BMI 30.38 kg/m²  Oxygen Saturation Interpretation: Normal.    Constitutional:  Alert, development consistent with age. Lungs: CTAB without wheezing, rales, or rhonchi. CV: RRR without pathologic murmurs or gallops. Knee:  right               Inspection: right knee without obvious deformity. Tenderness:  Mild TTP over anterior aspect of the right knee. Swelling/Effusion: Minimal edema noted over right knee. Deformity: No obvious deformity. ROM: ROM 0º-120º with mild to moderate discomfort. Skin:  Denies bruising noted. No abrasions, rashes, or erythema noted. Drawer's:  Positive anterior/posterior drawer sign. Hope's: Negative Hope's sign. Valgus/Varus Stress: Negative Varus/Valgus stress sign. Crepitus: Negative crepitus noted with flexion and extension. Hip: right            Tenderness:  No TTP.              ROM: FROM hip without pain. Joint(s) Below: right calf/ankle/foot                Tenderness:  No TTP over calf, ankle, or foot without any edema. ROM: FROM without pain or deficits. Neurovascular:             Sensory deficit: Sensation intact above and below the injury site. Pulse deficit: Pulses 2+ and bounding. Capillary refill: Less then 2 sec throughout. Gait:  Slightly antalgic gait, but able to bear weight with mild difficulty. Lymphatics: No lymphangitis or adenopathy noted. Neurological:  Alert and oriented. Motor functions intact. Test Results Section   (All laboratory and radiology results have been personally reviewed by myself)  Labs:    Imaging: All Radiology results interpreted by Radiologist unless otherwise noted. No results found. Medical Decision Making         Assessment / Plan   Impression(s):  Chandni Carey was seen today for knee pain. Diagnoses and all orders for this visit:    Acute pain of right knee  -     XR KNEE RIGHT (MIN 4 VIEWS);  Future  -     ketorolac (TORADOL) injection 30 mg    Atypical jeremie Mcginnis DO, Dermatology, [place] ([facility])          Order given for right knee pain, will call with results once available. Toradol injection given today in office. RICE protocol advised. F/u with PCP in 7-10 days for recheck and further management. ED sooner if symptoms worsen or change. ED immediately with any calf pain/swelling, worsening knee pain, paresthesias, weakness, fever, etc. Pt is in agreement with this care plan. All questions answered. No follow-ups on file.     ADRIA Toribio NP

## 2022-06-03 NOTE — RESULT ENCOUNTER NOTE
X-ray of the right knee is negative. Trial ibuprofen 600 mg 3 times daily with food. Follow-up with PCP.

## 2022-07-18 ENCOUNTER — OFFICE VISIT (OUTPATIENT)
Dept: FAMILY MEDICINE CLINIC | Age: 47
End: 2022-07-18
Payer: MEDICAID

## 2022-07-18 VITALS
RESPIRATION RATE: 20 BRPM | HEART RATE: 59 BPM | BODY MASS INDEX: 30.88 KG/M2 | TEMPERATURE: 97.3 F | WEIGHT: 228 LBS | DIASTOLIC BLOOD PRESSURE: 72 MMHG | SYSTOLIC BLOOD PRESSURE: 126 MMHG | HEIGHT: 72 IN | OXYGEN SATURATION: 99 %

## 2022-07-18 DIAGNOSIS — R11.0 NAUSEA: ICD-10-CM

## 2022-07-18 DIAGNOSIS — K29.00 ACUTE GASTRITIS WITHOUT HEMORRHAGE, UNSPECIFIED GASTRITIS TYPE: Primary | ICD-10-CM

## 2022-07-18 LAB
Lab: NORMAL
PERFORMING INSTRUMENT: NORMAL
QC PASS/FAIL: NORMAL
SARS-COV-2, POC: NORMAL

## 2022-07-18 PROCEDURE — G8427 DOCREV CUR MEDS BY ELIG CLIN: HCPCS | Performed by: NURSE PRACTITIONER

## 2022-07-18 PROCEDURE — 4004F PT TOBACCO SCREEN RCVD TLK: CPT | Performed by: NURSE PRACTITIONER

## 2022-07-18 PROCEDURE — 99213 OFFICE O/P EST LOW 20 MIN: CPT | Performed by: NURSE PRACTITIONER

## 2022-07-18 PROCEDURE — G8417 CALC BMI ABV UP PARAM F/U: HCPCS | Performed by: NURSE PRACTITIONER

## 2022-07-18 PROCEDURE — 87426 SARSCOV CORONAVIRUS AG IA: CPT | Performed by: NURSE PRACTITIONER

## 2022-07-18 RX ORDER — PANTOPRAZOLE SODIUM 20 MG/1
20 TABLET, DELAYED RELEASE ORAL
Qty: 30 TABLET | Refills: 0 | Status: SHIPPED
Start: 2022-07-18 | End: 2022-09-07

## 2022-07-18 RX ORDER — ONDANSETRON 4 MG/1
4 TABLET, ORALLY DISINTEGRATING ORAL 3 TIMES DAILY PRN
Qty: 21 TABLET | Refills: 0 | Status: SHIPPED
Start: 2022-07-18 | End: 2022-09-07

## 2022-07-18 NOTE — PROGRESS NOTES
mass, thyromegaly or thyroid tenderness. Trachea: Trachea normal.   Cardiovascular:      Rate and Rhythm: Normal rate and regular rhythm. Pulses: Normal pulses. Heart sounds: Normal heart sounds. Pulmonary:      Effort: Pulmonary effort is normal.      Breath sounds: Normal breath sounds. Abdominal:      General: Bowel sounds are normal.      Palpations: Abdomen is soft. Tenderness: There is abdominal tenderness in the epigastric area. Musculoskeletal:         General: Normal range of motion. Cervical back: Normal range of motion and neck supple. Lymphadenopathy:      Cervical: No cervical adenopathy. Skin:     General: Skin is warm and dry. Capillary Refill: Capillary refill takes less than 2 seconds. Neurological:      General: No focal deficit present. Mental Status: He is alert and oriented to person, place, and time. Sensory: Sensation is intact. Motor: Motor function is intact. Coordination: Coordination is intact. Gait: Gait is intact. Deep Tendon Reflexes: Reflexes normal.   Psychiatric:         Attention and Perception: Attention and perception normal.         Mood and Affect: Mood normal.         Speech: Speech normal.         Behavior: Behavior normal.         Thought Content: Thought content normal.         Cognition and Memory: Cognition and memory normal.         Judgment: Judgment normal.         Lab / Imaging Results   (All laboratory and radiology results have been personally reviewed by myself)  Labs:  Results for orders placed or performed in visit on 07/18/22   POCT COVID-19, Antigen   Result Value Ref Range    SARS-COV-2, POC Not-Detected Not Detected    Lot Number [id number]     QC Pass/Fail pass     Performing Instrument BD Veritor        Imaging: All Radiology results interpreted by Radiologist unless otherwise noted. No results found. Assessment/Plan  Tahira Xiao was seen today for nausea and diarrhea.     Diagnoses and all orders for this visit:    Acute gastritis without hemorrhage, unspecified gastritis type  -     ondansetron (ZOFRAN-ODT) 4 MG disintegrating tablet; Take 1 tablet by mouth 3 times daily as needed for Nausea or Vomiting  -     pantoprazole (PROTONIX) 20 MG tablet; Take 1 tablet by mouth every morning (before breakfast)    Nausea  -     POCT COVID-19, Antigen    Patient presents with nausea, vomiting and diarrhea x2 days. He does have midepigastric tenderness with palpation. Negative Romberg's and McBurney sign. We will treat for acute gastritis. Ondansetron and pantoprazole prescribed, side effects and administration instructions discussed. Rapid Covid testing in office is negative. Current CDC guidelines were discussed regarding quarantine and when to discontinue quarantine. Regardless of testing results, pt should also be fever free for 24 hours and symptoms should be improved overall prior to returning. Brat diet advance as tolerated. Increase fluids and rest.   Other symptomatic relief discussed including Tylenol prn pain/fever. Schedule f/u with PCP in 7-10 days if symptoms persist.  Go to ED sooner if symptoms worsen or change. ED immediately with high or refractory fever, progressive SOB, dyspnea, CP, calf pain/swelling, shaking chills, vomiting, abdominal pain, lethargy, flank pain, or decreased urinary output. Pt verbalizes understanding and is in agreement with plan of care. All questions answered. Daralene Boas, APRN - NP    *NOTE: This report was transcribed using voice recognition software. Every effort was made to ensure accuracy; however, inadvertent computerized transcription errors may be present.

## 2022-07-27 ENCOUNTER — OFFICE VISIT (OUTPATIENT)
Dept: FAMILY MEDICINE CLINIC | Age: 47
End: 2022-07-27
Payer: MEDICAID

## 2022-07-27 VITALS
HEIGHT: 72 IN | BODY MASS INDEX: 30.61 KG/M2 | OXYGEN SATURATION: 98 % | SYSTOLIC BLOOD PRESSURE: 120 MMHG | RESPIRATION RATE: 18 BRPM | WEIGHT: 226 LBS | TEMPERATURE: 98.6 F | HEART RATE: 87 BPM | DIASTOLIC BLOOD PRESSURE: 70 MMHG

## 2022-07-27 DIAGNOSIS — R07.9 CHEST PAIN, UNSPECIFIED TYPE: Primary | ICD-10-CM

## 2022-07-27 PROCEDURE — G8427 DOCREV CUR MEDS BY ELIG CLIN: HCPCS | Performed by: PHYSICIAN ASSISTANT

## 2022-07-27 PROCEDURE — G8417 CALC BMI ABV UP PARAM F/U: HCPCS | Performed by: PHYSICIAN ASSISTANT

## 2022-07-27 PROCEDURE — 99214 OFFICE O/P EST MOD 30 MIN: CPT | Performed by: PHYSICIAN ASSISTANT

## 2022-07-27 PROCEDURE — 4004F PT TOBACCO SCREEN RCVD TLK: CPT | Performed by: PHYSICIAN ASSISTANT

## 2022-07-27 PROCEDURE — 93000 ELECTROCARDIOGRAM COMPLETE: CPT | Performed by: PHYSICIAN ASSISTANT

## 2022-07-27 ASSESSMENT — ENCOUNTER SYMPTOMS
SORE THROAT: 0
SHORTNESS OF BREATH: 0
BACK PAIN: 0
PHOTOPHOBIA: 0
ABDOMINAL PAIN: 0
NAUSEA: 0
VOMITING: 0
COUGH: 1
DIARRHEA: 0

## 2022-07-27 NOTE — PROGRESS NOTES
22  Jessica Isabel : 1975 Sex: male  Age 49 y.o. Subjective:  Chief Complaint   Patient presents with    Cough         43-year-old male with a history of gastritis presents to the walk-in clinic for evaluation of chest pain. Patient was dropped off by his wife. He states about 10 minutes prior to arrival he went to let his dogs out and while walking them he developed midsternal chest pain which she describes to be a 10/10. He states the pain is radiating to his back. His chest pain worsens with inspiration. Patient denies shortness of breath or diaphoresis. No vomiting. He denies having high cholesterol, diabetes or hypertension. He states that he does know an uncle  of a heart attack but is unsure how old he was. Patient denies any personal history of CAD. He states that he has had a stress test but that it has been quite a while. Patient is a current smoker of tobacco and marijuana. Patient denies any recent surgery, travel, trauma or immobilization. He states 1 month ago he traveled 12 hours home from [place] but nothing within the past couple weeks. He does note that he has had a cough and congestion since . He has not taking any over-the-counter medications. Review of Systems   Constitutional:  Negative for chills and fever. HENT:  Positive for congestion. Negative for ear pain and sore throat. Eyes:  Negative for photophobia and visual disturbance. Respiratory:  Positive for cough. Negative for shortness of breath. Cardiovascular:  Positive for chest pain. Gastrointestinal:  Negative for abdominal pain, diarrhea, nausea and vomiting. Genitourinary:  Negative for difficulty urinating, dysuria, frequency and urgency. Musculoskeletal:  Negative for back pain, neck pain and neck stiffness. Skin:  Negative for rash. Neurological:  Negative for dizziness, syncope, weakness, light-headedness and headaches.    Hematological:  Negative for adenopathy. Does not bruise/bleed easily. Psychiatric/Behavioral:  Negative for agitation and confusion. All other systems reviewed and are negative. PMH:   History reviewed. No pertinent past medical history. History reviewed. No pertinent surgical history. History reviewed. No pertinent family history. Medications:     Current Outpatient Medications:     ondansetron (ZOFRAN-ODT) 4 MG disintegrating tablet, Take 1 tablet by mouth 3 times daily as needed for Nausea or Vomiting, Disp: 21 tablet, Rfl: 0    pantoprazole (PROTONIX) 20 MG tablet, Take 1 tablet by mouth every morning (before breakfast), Disp: 30 tablet, Rfl: 0    Allergies: Allergies   Allergen Reactions    Penicillins Rash       Social History:     Social History     Tobacco Use    Smoking status: Every Day    Smokeless tobacco: Never   Vaping Use    Vaping Use: Never used   Substance Use Topics    Alcohol use: Not Currently    Drug use: Yes     Types: Marijuana Debra Lemuel)       Patient lives at home. Physical Exam:     Vitals:    07/27/22 1458   BP: 120/70   Pulse: 87   Resp: 18   Temp: 98.6 °F (37 °C)   TempSrc: Temporal   SpO2: 98%   Weight: 226 lb (102.5 kg)   Height: 6' (1.829 m)       Exam:  Physical Exam  Vitals and nursing note reviewed. Constitutional:       General: He is not in acute distress. Appearance: He is well-developed. HENT:      Head: Normocephalic and atraumatic. Right Ear: Tympanic membrane normal.      Left Ear: Tympanic membrane normal.      Nose: Nose normal.      Mouth/Throat:      Mouth: Mucous membranes are moist.      Pharynx: Oropharynx is clear. Eyes:      Conjunctiva/sclera: Conjunctivae normal.      Pupils: Pupils are equal, round, and reactive to light. Cardiovascular:      Rate and Rhythm: Normal rate and regular rhythm. Pulmonary:      Effort: Pulmonary effort is normal. No respiratory distress. Breath sounds: Normal breath sounds.    Chest:      Chest wall: No deformity, swelling or tenderness. Abdominal:      General: Bowel sounds are normal.      Palpations: Abdomen is soft. Tenderness: There is no abdominal tenderness. Musculoskeletal:         General: Normal range of motion. Cervical back: Normal range of motion. No rigidity. Lymphadenopathy:      Cervical: No cervical adenopathy. Skin:     General: Skin is warm and dry. Neurological:      General: No focal deficit present. Mental Status: He is alert and oriented to person, place, and time. Psychiatric:         Mood and Affect: Mood normal.         Behavior: Behavior normal.         Thought Content: Thought content normal.         Judgment: Judgment normal.         Testing:           Medical Decision Making:       Patient upon arrival did not appear toxic or lethargic. Vital signs were reviewed. Past medical history reviewed. Allergies reviewed. Medications reviewed. Patient is presenting with the above complaint of chest pain. EKG was obtained. There is no evidence of ST segment elevation. No previous in the chart for comparison. Patient was educated of our limitations and express care and the need for more emergent evaluation in the emergency department. It was recommended the patient go directly to the emergency department by EMS. He declines and states that he would rather call his wife and have her take him to the emergency department. He understands the risks of driving by private vehicle including MVA, injury, decompensation, disability and death. He does agree to go directly to the emergency department. Clinical Impression:   Tavon Trevino was seen today for cough. Diagnoses and all orders for this visit:    Chest pain, unspecified type  -     EKG 12 Lead      Go directly to the emergency department. SIGNATURE: Meseret Nelson PA-C

## 2022-07-28 ENCOUNTER — OFFICE VISIT (OUTPATIENT)
Dept: FAMILY MEDICINE CLINIC | Age: 47
End: 2022-07-28
Payer: MEDICAID

## 2022-07-28 VITALS
WEIGHT: 226 LBS | DIASTOLIC BLOOD PRESSURE: 88 MMHG | OXYGEN SATURATION: 97 % | HEIGHT: 72 IN | RESPIRATION RATE: 18 BRPM | BODY MASS INDEX: 30.61 KG/M2 | SYSTOLIC BLOOD PRESSURE: 136 MMHG | TEMPERATURE: 98.7 F | HEART RATE: 85 BPM

## 2022-07-28 DIAGNOSIS — J06.9 VIRAL URI: Primary | ICD-10-CM

## 2022-07-28 PROCEDURE — G8417 CALC BMI ABV UP PARAM F/U: HCPCS | Performed by: NURSE PRACTITIONER

## 2022-07-28 PROCEDURE — G8427 DOCREV CUR MEDS BY ELIG CLIN: HCPCS | Performed by: NURSE PRACTITIONER

## 2022-07-28 PROCEDURE — 4004F PT TOBACCO SCREEN RCVD TLK: CPT | Performed by: NURSE PRACTITIONER

## 2022-07-28 PROCEDURE — 99212 OFFICE O/P EST SF 10 MIN: CPT | Performed by: NURSE PRACTITIONER

## 2022-07-28 RX ORDER — METHYLPREDNISOLONE 4 MG/1
TABLET ORAL
Qty: 1 KIT | Refills: 0 | Status: SHIPPED
Start: 2022-07-28 | End: 2022-09-07

## 2022-07-28 NOTE — PROGRESS NOTES
Subjective:  Chief Complaint   Patient presents with    Cough    Congestion       HPI:  The patient states that they have had a cough, runny nose and nasal congestion for the last 5 days. Cough is productive of sputum. The patient also reports mild sore throat without pain with swallowing/difficulty swallowing. Denies fever or chills but states felt warm. Patient denies CP or dyspnea. No vomiting or diarrhea. Patient has been taking OTC medications without improvement. The patient presents for evaluation. Notably, the patient was seen here yesterday and sent to the ER for evaluation for chest pain. I am unable to review all of these results although I can see that a chest x-ray was negative. The patient states that he had EKG and lab work which were both also negative, he was ultimately sent home with he thinks ibuprofen 800. He has not picked this up from the pharmacy yet. The patient states chest pain has resolved. He states he was actually coming into the office yesterday for respiratory symptoms, but chest pain occurred while he was in route. ROS:  Positive and pertinent negatives as per HPI. All other systems are reviewed and negative. Current Outpatient Medications:     ondansetron (ZOFRAN-ODT) 4 MG disintegrating tablet, Take 1 tablet by mouth 3 times daily as needed for Nausea or Vomiting, Disp: 21 tablet, Rfl: 0    pantoprazole (PROTONIX) 20 MG tablet, Take 1 tablet by mouth every morning (before breakfast), Disp: 30 tablet, Rfl: 0   Allergies   Allergen Reactions    Penicillins Rash        Objective:  Vitals:    07/28/22 1248   BP: 136/88   Pulse: 85   Resp: 18   Temp: 98.7 °F (37.1 °C)   TempSrc: Temporal   SpO2: 97%   Weight: 226 lb (102.5 kg)   Height: 6' (1.829 m)        Exam:  Const: Appears healthy and well developed. No signs of acute distress present. Vitals reviewed per triage. Head/Face: Normocephalic, atraumatic. Facies is symmetric. Eyes: PERRL.   ENMT: Tympanic membranes are pearly gray with good light reflex bilaterally. Nares are patent with clear rhinorrhea. Buccal mucosa is moist. Mild erythema in the posterior pharynx without edema of oropharynx or petechiae of palate. Neck: Supple and symmetric. Palpation reveals no adenopathy. No meningeal signs. Trachea midline. Resp: Lungs are clear to auscultation bilaterally without wheezes, rhonchi, or crackles. Chest expansion was symmetrical without accessory muscle use noted. CV: S1 is normal. S2 is normal.  Musculo: Patient moves extremities without pain or limitation. Pulses are equal bilaterally. Skin: Skin is warm and dry. Neuro: Alert and oriented x3. Speech is articulate and fluent. Psych: Mood and affect are appropriate to situation. Discussed potential COVID-19 testing, but patient is on day 5 of symptoms and these are improving. He is to be off work today for symptoms anyhow. We will wear a mask for the next 5 days preventatively. I have given a Medrol Dosepak, I have asked him not to take the ibuprofen, or any other nonsteroidal anti-inflammatories. Reviewed signs and symptoms that would warrant further evaluation    There are no diagnoses linked to this encounter.        Seen By:    Josh Hanna, ADRIA - CNP

## 2022-09-07 ENCOUNTER — OFFICE VISIT (OUTPATIENT)
Dept: FAMILY MEDICINE CLINIC | Age: 47
End: 2022-09-07
Payer: MEDICAID

## 2022-09-07 VITALS
HEIGHT: 72 IN | BODY MASS INDEX: 32.1 KG/M2 | WEIGHT: 237 LBS | DIASTOLIC BLOOD PRESSURE: 72 MMHG | TEMPERATURE: 97.6 F | SYSTOLIC BLOOD PRESSURE: 122 MMHG | HEART RATE: 72 BPM | OXYGEN SATURATION: 97 %

## 2022-09-07 DIAGNOSIS — M54.41 ACUTE BILATERAL LOW BACK PAIN WITH RIGHT-SIDED SCIATICA: Primary | ICD-10-CM

## 2022-09-07 PROCEDURE — 96372 THER/PROPH/DIAG INJ SC/IM: CPT | Performed by: PHYSICIAN ASSISTANT

## 2022-09-07 PROCEDURE — 4004F PT TOBACCO SCREEN RCVD TLK: CPT | Performed by: PHYSICIAN ASSISTANT

## 2022-09-07 PROCEDURE — G8417 CALC BMI ABV UP PARAM F/U: HCPCS | Performed by: PHYSICIAN ASSISTANT

## 2022-09-07 PROCEDURE — G8427 DOCREV CUR MEDS BY ELIG CLIN: HCPCS | Performed by: PHYSICIAN ASSISTANT

## 2022-09-07 PROCEDURE — 99214 OFFICE O/P EST MOD 30 MIN: CPT | Performed by: PHYSICIAN ASSISTANT

## 2022-09-07 RX ORDER — PREDNISONE 20 MG/1
TABLET ORAL
Qty: 18 TABLET | Refills: 0 | Status: SHIPPED | OUTPATIENT
Start: 2022-09-07

## 2022-09-07 RX ORDER — NAPROXEN 500 MG/1
TABLET ORAL
COMMUNITY
Start: 2022-07-27 | End: 2022-09-07

## 2022-09-07 RX ORDER — METHYLPREDNISOLONE ACETATE 40 MG/ML
40 INJECTION, SUSPENSION INTRA-ARTICULAR; INTRALESIONAL; INTRAMUSCULAR; SOFT TISSUE ONCE
Status: COMPLETED | OUTPATIENT
Start: 2022-09-07 | End: 2022-09-07

## 2022-09-07 RX ORDER — METHOCARBAMOL 500 MG/1
500 TABLET, FILM COATED ORAL EVERY EVENING
Qty: 10 TABLET | Refills: 0 | Status: SHIPPED | OUTPATIENT
Start: 2022-09-07 | End: 2022-09-17

## 2022-09-07 RX ORDER — KETOROLAC TROMETHAMINE 30 MG/ML
30 INJECTION, SOLUTION INTRAMUSCULAR; INTRAVENOUS ONCE
Status: COMPLETED | OUTPATIENT
Start: 2022-09-07 | End: 2022-09-07

## 2022-09-07 RX ADMIN — KETOROLAC TROMETHAMINE 30 MG: 30 INJECTION, SOLUTION INTRAMUSCULAR; INTRAVENOUS at 15:04

## 2022-09-07 RX ADMIN — METHYLPREDNISOLONE ACETATE 40 MG: 40 INJECTION, SUSPENSION INTRA-ARTICULAR; INTRALESIONAL; INTRAMUSCULAR; SOFT TISSUE at 15:06

## 2022-09-07 ASSESSMENT — ENCOUNTER SYMPTOMS
SHORTNESS OF BREATH: 0
PHOTOPHOBIA: 0
COUGH: 0
VOMITING: 0
BACK PAIN: 1
DIARRHEA: 0
SORE THROAT: 0
ABDOMINAL PAIN: 0
NAUSEA: 0

## 2022-09-07 NOTE — PROGRESS NOTES
22  Unruly Ibarra : 1975 Sex: male  Age 49 y.o. Subjective:  Chief Complaint   Patient presents with    Back Pain     X2 weeks         69-year-old male presents to the walk-in clinic for evaluation of back pain for the last 2 weeks. He denies any injury or trauma. He states that he has a bulging disc in his back and occasionally has worsening symptoms. He denies incontinence of his bowel or bladder. No urinary retention. No saddle anesthesia. He states it does radiate down his right leg to his knee. He states pain is low across his back. He states laying down makes it better. Walking makes it worse. He states he was supposed to go to work today and he knew that he would not be able to. He is not taking any over-the-counter medications. He is not using ice or heat. He states he cannot get to the chiropractor because his back is too inflamed. His wife drove him here for evaluation. Review of Systems   Constitutional:  Negative for chills and fever. HENT:  Negative for congestion, ear pain and sore throat. Eyes:  Negative for photophobia and visual disturbance. Respiratory:  Negative for cough and shortness of breath. Cardiovascular:  Negative for chest pain. Gastrointestinal:  Negative for abdominal pain, diarrhea, nausea and vomiting. Genitourinary:  Negative for difficulty urinating, dysuria, frequency and urgency. Musculoskeletal:  Positive for back pain. Negative for neck pain and neck stiffness. Skin:  Negative for rash. Neurological:  Negative for dizziness, syncope, weakness, light-headedness and headaches. Hematological:  Negative for adenopathy. Does not bruise/bleed easily. Psychiatric/Behavioral:  Negative for agitation and confusion. All other systems reviewed and are negative. PMH:   History reviewed. No pertinent past medical history. History reviewed. No pertinent surgical history. History reviewed.  No pertinent family history. Medications:     Current Outpatient Medications:     VENTOLIN  (90 Base) MCG/ACT inhaler, INHALE 1 PUFF INTO THE LUNGS EVERY 4 HOURS AS NEEDED FOR COUGH OR SHORTNESS OF BREATH, Disp: , Rfl:     methocarbamol (ROBAXIN) 500 MG tablet, Take 1 tablet by mouth every evening for 10 days, Disp: 10 tablet, Rfl: 0    predniSONE (DELTASONE) 20 MG tablet, Take 3 tabs x 3 days then 2 tabs x 3 days then 1 tab x 3 days, Disp: 18 tablet, Rfl: 0    Allergies: Allergies   Allergen Reactions    Penicillins Rash       Social History:     Social History     Tobacco Use    Smoking status: Every Day    Smokeless tobacco: Never   Vaping Use    Vaping Use: Never used   Substance Use Topics    Alcohol use: Not Currently    Drug use: Yes     Types: Marijuana Brody Semen)       Patient lives at home. Physical Exam:     Vitals:    09/07/22 1452   BP: 122/72   Pulse: 72   Temp: 97.6 °F (36.4 °C)   TempSrc: Temporal   SpO2: 97%   Weight: 237 lb (107.5 kg)   Height: 6' (1.829 m)       Exam:  Physical Exam  Vitals and nursing note reviewed. Constitutional:       General: He is not in acute distress. Appearance: He is well-developed. HENT:      Head: Normocephalic and atraumatic. Eyes:      Conjunctiva/sclera: Conjunctivae normal.      Pupils: Pupils are equal, round, and reactive to light. Cardiovascular:      Rate and Rhythm: Normal rate and regular rhythm. Pulmonary:      Effort: Pulmonary effort is normal. No respiratory distress. Breath sounds: Normal breath sounds. Abdominal:      General: Bowel sounds are normal.      Palpations: Abdomen is soft. Tenderness: There is no abdominal tenderness. Musculoskeletal:         General: Normal range of motion. Cervical back: Normal and normal range of motion. Thoracic back: Normal.      Lumbar back: Tenderness present. No bony tenderness.  Negative right straight leg raise test and negative left straight leg raise test.      Comments: Patient has paralumbar musculature bilaterally without evidence of trauma. There is no midline tenderness. He is ambulatory. He is able to stand on his tiptoes, rock on his heels and bend at the knees. Skin:     General: Skin is warm and dry. Neurological:      General: No focal deficit present. Mental Status: He is alert and oriented to person, place, and time. Psychiatric:         Mood and Affect: Mood normal.         Behavior: Behavior normal.         Thought Content: Thought content normal.         Judgment: Judgment normal.         Testing:           Medical Decision Making:       Patient upon arrival did not appear toxic or lethargic. Vital signs were reviewed. Past medical history reviewed. Allergies reviewed. Medications reviewed. Patient is presenting with the above complaint of back pain. Differential diagnosis was discussed. He does not clinically warrant x-ray imaging of the lumbar spine today. He will be given IM injections of 40 mg Depo-Medrol and 30 mg Toradol. He will be given prescriptions for robaxin and prednisone. He was educated on side effects of the medications. He is to do her gentle range of motion exercises. He was also educated on physical therapy, chiropractor and acupuncture. He is to follow-up closely with his PCP. He will return or go to the emergency department for worsening symptoms. He understands the plan is agreeable. Clinical Impression:   Nemo Hernandez was seen today for back pain. Diagnoses and all orders for this visit:    Acute bilateral low back pain with right-sided sciatica    Other orders  -     methocarbamol (ROBAXIN) 500 MG tablet; Take 1 tablet by mouth every evening for 10 days  -     predniSONE (DELTASONE) 20 MG tablet;  Take 3 tabs x 3 days then 2 tabs x 3 days then 1 tab x 3 days  -     ketorolac (TORADOL) injection 30 mg  -     methylPREDNISolone acetate (DEPO-MEDROL) injection 40 mg      The patient is to call for any concerns or return if any of the signs or symptoms worsen. The patient is to follow-up with PCP in the next 2-3 days for repeat evaluation repeat assessment or go directly to the emergency department. SIGNATURE: Meseret Solano PA-C

## 2023-01-12 ENCOUNTER — OFFICE VISIT (OUTPATIENT)
Dept: FAMILY MEDICINE CLINIC | Age: 48
End: 2023-01-12
Payer: MEDICAID

## 2023-01-12 VITALS
BODY MASS INDEX: 32.1 KG/M2 | WEIGHT: 237 LBS | HEIGHT: 72 IN | OXYGEN SATURATION: 99 % | HEART RATE: 91 BPM | RESPIRATION RATE: 18 BRPM | DIASTOLIC BLOOD PRESSURE: 84 MMHG | SYSTOLIC BLOOD PRESSURE: 124 MMHG | TEMPERATURE: 98.2 F

## 2023-01-12 DIAGNOSIS — K52.9 ACUTE GASTROENTERITIS: Primary | ICD-10-CM

## 2023-01-12 PROCEDURE — G8427 DOCREV CUR MEDS BY ELIG CLIN: HCPCS | Performed by: NURSE PRACTITIONER

## 2023-01-12 PROCEDURE — G8417 CALC BMI ABV UP PARAM F/U: HCPCS | Performed by: NURSE PRACTITIONER

## 2023-01-12 PROCEDURE — G8484 FLU IMMUNIZE NO ADMIN: HCPCS | Performed by: NURSE PRACTITIONER

## 2023-01-12 PROCEDURE — 4004F PT TOBACCO SCREEN RCVD TLK: CPT | Performed by: NURSE PRACTITIONER

## 2023-01-12 PROCEDURE — 99213 OFFICE O/P EST LOW 20 MIN: CPT | Performed by: NURSE PRACTITIONER

## 2023-01-12 RX ORDER — ONDANSETRON 4 MG/1
4 TABLET, ORALLY DISINTEGRATING ORAL 3 TIMES DAILY PRN
Qty: 21 TABLET | Refills: 0 | Status: SHIPPED | OUTPATIENT
Start: 2023-01-12

## 2023-01-12 NOTE — PROGRESS NOTES
Chief Complaint:       Other      History of Present Illness   Source of history provided by:  patient. Dimitri Harris is a 49 y.o. old male who presents to walk-in care for complaints of nausea   X 1 days. Associated symptoms include diarrhea. Since onset the symptoms have been persistent. The patient has had known consumption of contaminated food. He reports he ate a steak sandwich at a local restaurant last night and symptoms started this morning. No recent antibiotic use, and no recent international travel. The patient is tolerating fluids with a decreased appetite. The patient has not pertinent past medical gastrointestinal history or any abdominal surgeries. The symptoms are aggravated by eating and relieved by bowel movements. There has been NO abdominal pain, back pain, chills, cloudy urine, constipation, dysuria, headache, hematuria, penile discharge, sweating, urinary frequency, urinary incontinence, urinary urgency, or vomiting. ROS   Unless otherwise stated in this report or unable to obtain because of the patient's clinical or mental status as evidenced by the medical record, this patients's positive and negative responses for Review of Systems, constitutional, psych, eyes, ENT, cardiovascular, respiratory, gastrointestinal, neurological, genitourinary, musculoskeletal, integument systems and systems related to the presenting problem are either stated in the preceding or were not pertinent or were negative for the symptoms and/or complaints related to the medical problem. Past Medical History:  has no past medical history on file. Past Surgical History:  has no past surgical history on file. Social History:  reports that he has been smoking. He has never used smokeless tobacco. He reports that he does not currently use alcohol. He reports current drug use. Drug: Marijuana Shellye Ivetteitt). Family History: family history is not on file.    Allergies: Penicillins    Physical Exam   Vital Signs: /84   Pulse 91   Temp 98.2 °F (36.8 °C) (Temporal)   Resp 18   Ht 6' (1.829 m)   Wt 237 lb (107.5 kg)   SpO2 99%   BMI 32.14 kg/m²    Oxygen Saturation Interpretation: Normal.    General Appearance/Constitutional:  Alert, development consistent with age, NAD. HEENT:  NCAT. MMMP. Lungs: CTAB without wheezing, rales, or rhonchi. Heart:  RRR, no murmurs, rubs, or gallops. Abdomen:         General Appearance: No obvious trauma or bruising. No rashes or lesions. Bowel sounds: BS+x4       Distension:  No distension. Tenderness: TTP in the generalized abdomen, no localized tenderness. Negative McBurney sign. Negative Carlos sign. Liver/Spleen: Non-tender and no hepatosplenomegaly. Pulsatile Mass: None noted. Back: CVA Tenderness: No bilateral tenderness or bruising. Skin:  Normal turgor. Warm, dry, without visible rash, unless noted elsewhere. Neurological:  Orientation age-appropriate. Motor functions intact. Lab / Imaging Results   (All laboratory and radiology results have been personally reviewed by myself)  Labs:  No results found for this visit on 01/12/23. Imaging: All Radiology results interpreted by Radiologist unless otherwise noted. No results found. Assessment / Plan   Impression(s):  Vickie Adamson was seen today for other. Diagnoses and all orders for this visit:    Acute gastroenteritis    Other orders  -     ondansetron (ZOFRAN-ODT) 4 MG disintegrating tablet; Take 1 tablet by mouth 3 times daily as needed for Nausea or Vomiting      Discussed with the patient the limitations of walk-in care and cannot exclude life-threatening illnesses such as peritonitis, acute appendicitis, acute cholecystitis. Pt advised that given clinical assessment, this illness is likely viral and should resolve with time and conservative measures. Script written for Zofran, side effects discussed.  Increase fluids and rest. Clear liquids progressing to [employer] as tolerated. A work excuse was provided for today. Advise f/u with PCP in 3-5 days if symptoms persist. ED sooner if symptoms worsen or change. ED immediately with the development of high or refractory fever, severe or worsening abdominal pain, hematochezia, coffee-ground emesis, bloody stools, lethargy, CP, or SOB. Pt states understanding and is in agreement with this care plan. All questions answered. No follow-ups on file.     ADRIA Gonzalez NP

## 2023-02-07 ENCOUNTER — OFFICE VISIT (OUTPATIENT)
Dept: FAMILY MEDICINE CLINIC | Age: 48
End: 2023-02-07
Payer: MEDICAID

## 2023-02-07 VITALS
TEMPERATURE: 97.2 F | BODY MASS INDEX: 32.1 KG/M2 | SYSTOLIC BLOOD PRESSURE: 134 MMHG | WEIGHT: 237 LBS | DIASTOLIC BLOOD PRESSURE: 76 MMHG | RESPIRATION RATE: 20 BRPM | OXYGEN SATURATION: 97 % | HEART RATE: 78 BPM | HEIGHT: 72 IN

## 2023-02-07 DIAGNOSIS — R19.7 DIARRHEA, UNSPECIFIED TYPE: Primary | ICD-10-CM

## 2023-02-07 DIAGNOSIS — R11.0 NAUSEA: ICD-10-CM

## 2023-02-07 PROCEDURE — G8417 CALC BMI ABV UP PARAM F/U: HCPCS | Performed by: PHYSICIAN ASSISTANT

## 2023-02-07 PROCEDURE — 99213 OFFICE O/P EST LOW 20 MIN: CPT | Performed by: PHYSICIAN ASSISTANT

## 2023-02-07 PROCEDURE — G8484 FLU IMMUNIZE NO ADMIN: HCPCS | Performed by: PHYSICIAN ASSISTANT

## 2023-02-07 PROCEDURE — G8427 DOCREV CUR MEDS BY ELIG CLIN: HCPCS | Performed by: PHYSICIAN ASSISTANT

## 2023-02-07 PROCEDURE — 4004F PT TOBACCO SCREEN RCVD TLK: CPT | Performed by: PHYSICIAN ASSISTANT

## 2023-02-07 RX ORDER — CYCLOBENZAPRINE HCL 10 MG
TABLET ORAL
COMMUNITY
Start: 2022-12-12

## 2023-02-07 RX ORDER — LIDOCAINE 50 MG/G
PATCH TOPICAL
COMMUNITY
Start: 2023-01-08

## 2023-02-07 RX ORDER — ONDANSETRON 4 MG/1
4 TABLET, ORALLY DISINTEGRATING ORAL 3 TIMES DAILY PRN
Qty: 21 TABLET | Refills: 0 | Status: SHIPPED | OUTPATIENT
Start: 2023-02-07

## 2023-02-07 RX ORDER — NAPROXEN 500 MG/1
TABLET ORAL
COMMUNITY
Start: 2022-12-12

## 2023-02-07 ASSESSMENT — ENCOUNTER SYMPTOMS
COUGH: 0
ABDOMINAL PAIN: 0
NAUSEA: 1
SORE THROAT: 0
VOMITING: 0
PHOTOPHOBIA: 0
BACK PAIN: 0
SHORTNESS OF BREATH: 0
DIARRHEA: 1

## 2023-02-07 NOTE — LETTER
[facility]  [address]  Phone: [phone]  Fax: [address], [place]        February 7, 2023     Patient: Jennifer Pickett   YOB: 1975   Date of Visit: 2/7/2023       To Whom it May Concern:    Jennifer Pickett was seen in my clinic on 2/7/2023. He may return to work on 2/8/2023. If you have any questions or concerns, please don't hesitate to call.     Sincerely,         KIMMY LACEY

## 2023-02-07 NOTE — PROGRESS NOTES
23  Mateo Stuart : 1975 Sex: male  Age 49 y.o. Subjective:  Chief Complaint   Patient presents with    Diarrhea         39-year-old male presents to the walk-in clinic for evaluation of nausea and diarrhea that started today while at work. He states he had to leave work about 4 hours early and his work is requesting any be seen and provide a note. Patient denies vomiting. He denies any spoiled or contaminated food consumption. He denies any known sick contacts. =No abdominal pain. No fever or chills. He is drinking fluids. He did have similar symptoms in January but states that was related to him consuming spoiled/contaminated food. Review of Systems   Constitutional:  Negative for chills and fever. HENT:  Negative for congestion, ear pain and sore throat. Eyes:  Negative for photophobia and visual disturbance. Respiratory:  Negative for cough and shortness of breath. Cardiovascular:  Negative for chest pain. Gastrointestinal:  Positive for diarrhea and nausea. Negative for abdominal pain and vomiting. Genitourinary:  Negative for difficulty urinating, dysuria, frequency and urgency. Musculoskeletal:  Negative for back pain, neck pain and neck stiffness. Skin:  Negative for rash. Neurological:  Negative for dizziness, syncope, weakness, light-headedness and headaches. Hematological:  Negative for adenopathy. Does not bruise/bleed easily. Psychiatric/Behavioral:  Negative for agitation and confusion. All other systems reviewed and are negative. PMH:   History reviewed. No pertinent past medical history. History reviewed. No pertinent surgical history. History reviewed. No pertinent family history. Medications:     Current Outpatient Medications:     cyclobenzaprine (FLEXERIL) 10 MG tablet, , Disp: , Rfl:     lidocaine (LIDODERM) 5 %, APPLY 1 PATCH TOPICALLY TO THE SKIN EVERY DAY.  LEAVE ON MOST PAINFUL AREA FOR UP TO 12 HOURS, Disp: , Rfl: naproxen (NAPROSYN) 500 MG tablet, , Disp: , Rfl:     ondansetron (ZOFRAN-ODT) 4 MG disintegrating tablet, Take 1 tablet by mouth 3 times daily as needed for Nausea or Vomiting, Disp: 21 tablet, Rfl: 0    VENTOLIN  (90 Base) MCG/ACT inhaler, INHALE 1 PUFF INTO THE LUNGS EVERY 4 HOURS AS NEEDED FOR COUGH OR SHORTNESS OF BREATH, Disp: , Rfl:     predniSONE (DELTASONE) 20 MG tablet, Take 3 tabs x 3 days then 2 tabs x 3 days then 1 tab x 3 days (Patient not taking: No sig reported), Disp: 18 tablet, Rfl: 0    Allergies: Allergies   Allergen Reactions    Penicillins Rash       Social History:     Social History     Tobacco Use    Smoking status: Every Day    Smokeless tobacco: Never   Vaping Use    Vaping Use: Never used   Substance Use Topics    Alcohol use: Not Currently    Drug use: Yes     Types: Marijuana Hector Jesus)       Patient lives at home. Physical Exam:     Vitals:    02/07/23 1425   BP: 134/76   Pulse: 78   Resp: 20   Temp: 97.2 °F (36.2 °C)   TempSrc: Temporal   SpO2: 97%   Weight: 237 lb (107.5 kg)   Height: 6' (1.829 m)       Exam:  Physical Exam  Vitals and nursing note reviewed. Constitutional:       General: He is not in acute distress. Appearance: He is well-developed. HENT:      Head: Normocephalic and atraumatic. Right Ear: Tympanic membrane normal.      Left Ear: Tympanic membrane normal.      Nose: Nose normal.      Mouth/Throat:      Mouth: Mucous membranes are moist.      Pharynx: Oropharynx is clear. Eyes:      Conjunctiva/sclera: Conjunctivae normal.      Pupils: Pupils are equal, round, and reactive to light. Cardiovascular:      Rate and Rhythm: Normal rate and regular rhythm. Pulmonary:      Effort: Pulmonary effort is normal. No respiratory distress. Breath sounds: Normal breath sounds. Abdominal:      General: Bowel sounds are normal. There is no distension. Palpations: Abdomen is soft. Tenderness: There is no abdominal tenderness.  There is no right CVA tenderness, left CVA tenderness, guarding or rebound. Musculoskeletal:         General: Normal range of motion. Cervical back: Normal range of motion. No rigidity. Lymphadenopathy:      Cervical: No cervical adenopathy. Skin:     General: Skin is warm and dry. Neurological:      General: No focal deficit present. Mental Status: He is alert and oriented to person, place, and time. Mental status is at baseline. Psychiatric:         Mood and Affect: Mood normal.         Behavior: Behavior normal.         Thought Content: Thought content normal.         Judgment: Judgment normal.         Testing:           Medical Decision Making:     Patient upon arrival did not appear toxic or lethargic. Vital signs were reviewed. Past medical history reviewed. Allergies reviewed. Medications reviewed. Patient is presenting with the above complaint of nausea and diarrhea. Differential diagnosis was discussed with the patient. Signs and symptoms do seem consistent with viral gastroenteritis. He is to drink plenty of fluids. He will be given a prescription for Zofran as needed for nausea. He was provided with a work note for today. Patient is to follow-up as needed with his PCP. If any signs or symptoms worsen he will return or go to the emergency department. Clinical Impression:   [facility] VIRG[facility] was seen today for diarrhea. Diagnoses and all orders for this visit:    Diarrhea, unspecified type    Nausea      The patient is to call for any concerns or return if any of the signs or symptoms worsen. The patient is to follow-up with PCP in the next 2-3 days for repeat evaluation repeat assessment or go directly to the emergency department. SIGNATURE: Meseret Barnett PA-C

## 2023-02-27 ENCOUNTER — OFFICE VISIT (OUTPATIENT)
Dept: FAMILY MEDICINE CLINIC | Age: 48
End: 2023-02-27
Payer: MEDICAID

## 2023-02-27 VITALS
OXYGEN SATURATION: 99 % | RESPIRATION RATE: 20 BRPM | HEIGHT: 71 IN | WEIGHT: 237 LBS | DIASTOLIC BLOOD PRESSURE: 84 MMHG | BODY MASS INDEX: 33.18 KG/M2 | SYSTOLIC BLOOD PRESSURE: 130 MMHG | TEMPERATURE: 97.4 F | HEART RATE: 77 BPM

## 2023-02-27 DIAGNOSIS — K29.00 ACUTE GASTRITIS WITHOUT HEMORRHAGE, UNSPECIFIED GASTRITIS TYPE: Primary | ICD-10-CM

## 2023-02-27 DIAGNOSIS — K21.9 GASTROESOPHAGEAL REFLUX DISEASE WITHOUT ESOPHAGITIS: ICD-10-CM

## 2023-02-27 PROCEDURE — 4004F PT TOBACCO SCREEN RCVD TLK: CPT | Performed by: NURSE PRACTITIONER

## 2023-02-27 PROCEDURE — G8427 DOCREV CUR MEDS BY ELIG CLIN: HCPCS | Performed by: NURSE PRACTITIONER

## 2023-02-27 PROCEDURE — G8417 CALC BMI ABV UP PARAM F/U: HCPCS | Performed by: NURSE PRACTITIONER

## 2023-02-27 PROCEDURE — 99214 OFFICE O/P EST MOD 30 MIN: CPT | Performed by: NURSE PRACTITIONER

## 2023-02-27 PROCEDURE — G8484 FLU IMMUNIZE NO ADMIN: HCPCS | Performed by: NURSE PRACTITIONER

## 2023-02-27 RX ORDER — ONDANSETRON 4 MG/1
4 TABLET, ORALLY DISINTEGRATING ORAL 3 TIMES DAILY PRN
Qty: 21 TABLET | Refills: 0 | Status: SHIPPED | OUTPATIENT
Start: 2023-02-27

## 2023-02-27 RX ORDER — OMEPRAZOLE 20 MG/1
20 CAPSULE, DELAYED RELEASE ORAL
Qty: 30 CAPSULE | Refills: 0 | Status: SHIPPED | OUTPATIENT
Start: 2023-02-27

## 2023-02-27 NOTE — PROGRESS NOTES
Chief Complaint:       Diarrhea and Emesis      History of Present Illness   Source of history provided by:  patient. Flaco Cowart is a 49 y.o. old male who presents to walk-in care for complaints of nausea   X 1 days. Associated symptoms include diarrhea. Since onset the symptoms have been persistent. The patient has had no known consumption of contaminated food. No recent antibiotic use, and no recent international travel. The patient is tolerating fluids with a decreased appetite. The patient has not pertinent past medical gastrointestinal history or any abdominal surgeries. He has been to walk-in clinic numerous times for similar symptoms. He has not established with a PCP. He does not follow with gastroenterology. The symptoms are aggravated by eating and relieved by bowel movements. There has been NO abdominal pain, back pain, chills, cloudy urine, constipation, dysuria, headache, hematuria, penile discharge, sweating, urinary frequency, urinary incontinence, urinary urgency, or vomiting. ROS   Unless otherwise stated in this report or unable to obtain because of the patient's clinical or mental status as evidenced by the medical record, this patients's positive and negative responses for Review of Systems, constitutional, psych, eyes, ENT, cardiovascular, respiratory, gastrointestinal, neurological, genitourinary, musculoskeletal, integument systems and systems related to the presenting problem are either stated in the preceding or were not pertinent or were negative for the symptoms and/or complaints related to the medical problem. Past Medical History:  has no past medical history on file. Past Surgical History:  has no past surgical history on file. Social History:  reports that he has been smoking. He has never used smokeless tobacco. He reports that he does not currently use alcohol. He reports current drug use. Drug: Marijuana Velvet Baller).   Family History: family history is not on file.   Allergies: Penicillins    Physical Exam   Vital Signs:  /84   Pulse 77   Temp 97.4 °F (36.3 °C) (Temporal)   Resp 20   Ht 5' 11\" (1.803 m)   Wt 237 lb (107.5 kg)   SpO2 99%   BMI 33.05 kg/m²    Oxygen Saturation Interpretation: Normal.    General Appearance/Constitutional:  Alert, development consistent with age, NAD. HEENT:  NCAT. MMMP. Lungs: CTAB without wheezing, rales, or rhonchi. Heart:  RRR, no murmurs, rubs, or gallops. Abdomen:         General Appearance: No obvious trauma or bruising. No rashes or lesions. Bowel sounds: BS+x4       Distension:  No distension. Tenderness: TTP in the mid epigastric region of the abdomen. Negative McBurney sign. Negative Carlos sign. Liver/Spleen: Non-tender and no hepatosplenomegaly. Pulsatile Mass: None noted. Back: CVA Tenderness: No bilateral tenderness or bruising. Skin:  Normal turgor. Warm, dry, without visible rash, unless noted elsewhere. Neurological:  Orientation age-appropriate. Motor functions intact. Lab / Imaging Results   (All laboratory and radiology results have been personally reviewed by myself)  Labs:  No results found for this visit on 02/27/23. Imaging: All Radiology results interpreted by Radiologist unless otherwise noted. No results found. Assessment / Plan   Impression(s):  Mary Martinez was seen today for diarrhea and emesis. Diagnoses and all orders for this visit:    Acute gastritis without hemorrhage, unspecified gastritis type  -     omeprazole (PRILOSEC) 20 MG delayed release capsule; Take 1 capsule by mouth every morning (before breakfast)  -     ondansetron (ZOFRAN-ODT) 4 MG disintegrating tablet; Take 1 tablet by mouth 3 times daily as needed for Nausea or Vomiting  -     [facility] [place], [place], Gastroenterology, [facility]    Gastroesophageal reflux disease without esophagitis  -     omeprazole (PRILOSEC) 20 MG delayed release capsule;  Take 1 capsule by mouth every morning (before breakfast)  -     ondansetron (ZOFRAN-ODT) 4 MG disintegrating tablet; Take 1 tablet by mouth 3 times daily as needed for Nausea or Vomiting  -     [facility], [address] [facility], Gastroenterology, [facility]      Discussed with the patient the limitations of walk-in care and cannot exclude life-threatening illnesses such as peritonitis, acute appendicitis, acute cholecystitis. Pt advised that given clinical assessment, this illness is likely poorly controlled gastritis. Script written for Zofran and omeprazole, side effects discussed. Referral placed to gastroenterology. He should establish with a PCP. Increase fluids and rest. Clear liquids progressing to [place] as tolerated. A work excuse was provided for today. ED sooner if symptoms worsen or change. ED immediately with the development of high or refractory fever, severe or worsening abdominal pain, hematochezia, coffee-ground emesis, bloody stools, lethargy, CP, or SOB. Pt states understanding and is in agreement with this care plan. All questions answered. No follow-ups on file.     Miah Foley, APRN - NP

## 2023-03-06 ENCOUNTER — OFFICE VISIT (OUTPATIENT)
Dept: FAMILY MEDICINE CLINIC | Age: 48
End: 2023-03-06
Payer: MEDICAID

## 2023-03-06 VITALS
HEIGHT: 71 IN | TEMPERATURE: 98.2 F | DIASTOLIC BLOOD PRESSURE: 70 MMHG | SYSTOLIC BLOOD PRESSURE: 138 MMHG | BODY MASS INDEX: 32.9 KG/M2 | HEART RATE: 72 BPM | WEIGHT: 235 LBS | RESPIRATION RATE: 16 BRPM | OXYGEN SATURATION: 97 %

## 2023-03-06 DIAGNOSIS — J01.40 ACUTE NON-RECURRENT PANSINUSITIS: Primary | ICD-10-CM

## 2023-03-06 DIAGNOSIS — R19.7 DIARRHEA, UNSPECIFIED TYPE: ICD-10-CM

## 2023-03-06 LAB
Lab: NORMAL
PERFORMING INSTRUMENT: NORMAL
QC PASS/FAIL: NORMAL
SARS-COV-2, POC: NORMAL

## 2023-03-06 PROCEDURE — G8484 FLU IMMUNIZE NO ADMIN: HCPCS | Performed by: NURSE PRACTITIONER

## 2023-03-06 PROCEDURE — G8417 CALC BMI ABV UP PARAM F/U: HCPCS | Performed by: NURSE PRACTITIONER

## 2023-03-06 PROCEDURE — 87426 SARSCOV CORONAVIRUS AG IA: CPT | Performed by: NURSE PRACTITIONER

## 2023-03-06 PROCEDURE — 4004F PT TOBACCO SCREEN RCVD TLK: CPT | Performed by: NURSE PRACTITIONER

## 2023-03-06 PROCEDURE — G8427 DOCREV CUR MEDS BY ELIG CLIN: HCPCS | Performed by: NURSE PRACTITIONER

## 2023-03-06 PROCEDURE — 99213 OFFICE O/P EST LOW 20 MIN: CPT | Performed by: NURSE PRACTITIONER

## 2023-03-06 RX ORDER — AZITHROMYCIN 250 MG/1
250 TABLET, FILM COATED ORAL SEE ADMIN INSTRUCTIONS
Qty: 6 TABLET | Refills: 0 | Status: SHIPPED | OUTPATIENT
Start: 2023-03-06 | End: 2023-03-11

## 2023-03-06 NOTE — PROGRESS NOTES
Chief Complaint   Cough (Symptoms started yesterday ), Diarrhea, and Fatigue      HPI   Source of history provided by: patient. Arik Pruitt is a 49 y.o. old male who presents to walk-in care for evaluation of nasal congestion X 2 days. Associated symptoms include nasal congestion, rhinorrhea, cough, and nausea and diarrhea. Since onset symptoms have been worsening. The patient is full vaccinated for COVID. Has taken nothing at home with some symptomatic relief. Denies fever, chills, chest congestion, chest pain, shortness of breath, otalgia, malaise, and sinus pressure. Pertinent PMH of: PMHpositive: nothing respiratory. Denies any PMH of URIhistory: COPD, asthma, recurrent bronchitis, and pneumonia. The patient is a current everyday smoker. ROS   Pertinent positives and negatives are stated within HPI, all other systems reviewed and are negative. Past Medical History:  has no past medical history on file. Surgical History:  has no past surgical history on file. Social History:  reports that he has been smoking. He has never used smokeless tobacco. He reports that he does not currently use alcohol. He reports current drug use. Drug: Marijuana Debra Hdez). Family History: family history is not on file. Allergies: Penicillins    Physical Exam      VS:  /70   Pulse 72   Temp 98.2 °F (36.8 °C)   Resp 16   Ht 5' 11\" (1.803 m)   Wt 235 lb (106.6 kg)   SpO2 97%   BMI 32.78 kg/m²    Oxygen Saturation Interpretation: Normal.    Physical Exam  Vitals and nursing note reviewed. Constitutional:       Appearance: Normal appearance. He is normal weight. HENT:      Head: Normocephalic and atraumatic. Right Ear: Tympanic membrane, ear canal and external ear normal.      Left Ear: Tympanic membrane, ear canal and external ear normal.      Nose: Congestion and rhinorrhea present. Mouth/Throat:      Mouth: Mucous membranes are moist.      Pharynx: Oropharynx is clear.  Posterior oropharyngeal erythema present. Comments: Clear post nasal drip  Eyes:      Extraocular Movements: Extraocular movements intact. Conjunctiva/sclera: Conjunctivae normal.      Pupils: Pupils are equal, round, and reactive to light. Neck:      Thyroid: No thyroid mass, thyromegaly or thyroid tenderness. Trachea: Trachea normal.   Cardiovascular:      Rate and Rhythm: Normal rate and regular rhythm. Pulses: Normal pulses. Heart sounds: Normal heart sounds. Pulmonary:      Effort: Pulmonary effort is normal.      Breath sounds: Normal breath sounds. Abdominal:      General: Bowel sounds are normal. There is no distension. Palpations: Abdomen is soft. Tenderness: There is no abdominal tenderness. Musculoskeletal:         General: Normal range of motion. Cervical back: Normal range of motion and neck supple. Lymphadenopathy:      Cervical: No cervical adenopathy. Skin:     General: Skin is warm and dry. Capillary Refill: Capillary refill takes less than 2 seconds. Neurological:      General: No focal deficit present. Mental Status: He is alert and oriented to person, place, and time. Sensory: Sensation is intact. Motor: Motor function is intact. Coordination: Coordination is intact. Gait: Gait is intact. Deep Tendon Reflexes: Reflexes normal.   Psychiatric:         Attention and Perception: Attention and perception normal.         Mood and Affect: Mood normal.         Speech: Speech normal.         Behavior: Behavior normal.         Thought Content:  Thought content normal.         Cognition and Memory: Cognition and memory normal.         Judgment: Judgment normal.         Lab / Imaging Results   (All laboratory and radiology results have been personally reviewed by myself)  Labs:  Results for orders placed or performed in visit on 03/06/23   POCT COVID-19, Antigen   Result Value Ref Range    SARS-COV-2, POC Not-Detected Not Detected Lot Number [id number]     QC Pass/Fail pass     Performing Instrument BD Veritor        Imaging: All Radiology results interpreted by Radiologist unless otherwise noted. No results found. Assessment/Plan  Suzanne Jesus was seen today for cough, diarrhea and fatigue. Diagnoses and all orders for this visit:    Acute non-recurrent pansinusitis  -     azithromycin (ZITHROMAX) 250 MG tablet; Take 1 tablet by mouth See Admin Instructions for 5 days 500mg on day 1 followed by 250mg on days 2 - 5    Diarrhea, unspecified type  -     POCT COVID-19, Antigen      Rapid Covid testing in office is negative. Current CDC guidelines were discussed regarding quarantine and when to discontinue quarantine. Regardless of testing results, pt should also be fever free for 24 hours and symptoms should be improved overall prior to returning. Increase fluids and rest.   Other symptomatic relief discussed including Tylenol prn pain/fever. Schedule f/u with PCP in 7-10 days if symptoms persist.  Go to ED sooner if symptoms worsen or change. ED immediately with high or refractory fever, progressive SOB, dyspnea, CP, calf pain/swelling, shaking chills, vomiting, abdominal pain, lethargy, flank pain, or decreased urinary output. Pt verbalizes understanding and is in agreement with plan of care. All questions answered. ADRIA Falk NP    *NOTE: This report was transcribed using voice recognition software. Every effort was made to ensure accuracy; however, inadvertent computerized transcription errors may be present.

## 2023-03-16 ENCOUNTER — INITIAL CONSULT (OUTPATIENT)
Dept: GASTROENTEROLOGY | Age: 48
End: 2023-03-16

## 2023-03-16 VITALS
DIASTOLIC BLOOD PRESSURE: 84 MMHG | TEMPERATURE: 97 F | OXYGEN SATURATION: 99 % | HEART RATE: 81 BPM | RESPIRATION RATE: 16 BRPM | BODY MASS INDEX: 33.18 KG/M2 | WEIGHT: 237 LBS | SYSTOLIC BLOOD PRESSURE: 139 MMHG | HEIGHT: 71 IN

## 2023-03-16 DIAGNOSIS — R10.10 UPPER ABDOMINAL PAIN: ICD-10-CM

## 2023-03-16 DIAGNOSIS — Z12.11 COLON CANCER SCREENING: Primary | ICD-10-CM

## 2023-03-16 DIAGNOSIS — K21.9 GASTROESOPHAGEAL REFLUX DISEASE WITHOUT ESOPHAGITIS: ICD-10-CM

## 2023-03-16 RX ORDER — OMEPRAZOLE 20 MG/1
CAPSULE, DELAYED RELEASE ORAL
Qty: 30 CAPSULE | Refills: 0 | Status: SHIPPED | OUTPATIENT
Start: 2023-03-16

## 2023-03-16 RX ORDER — IBUPROFEN 800 MG/1
TABLET ORAL
COMMUNITY
Start: 2023-02-19

## 2023-03-16 RX ORDER — SODIUM, POTASSIUM,MAG SULFATES 17.5-3.13G
1 SOLUTION, RECONSTITUTED, ORAL ORAL ONCE
Qty: 1 EACH | Refills: 0 | Status: SHIPPED | OUTPATIENT
Start: 2023-03-16 | End: 2023-03-16

## 2023-03-16 NOTE — PROGRESS NOTES
Amena Rosario (:  1975) is a 49 y.o. male, here for evaluation of the following chief complaint(s):  New Patient (Referred by Fiona Washburn for GERD/ Gastritis )      SUBJECTIVE/OBJECTIVE:  HPI:    Dionte Bell is a very pleasant 49year old gentleman that presents today with complaints of left sided abdominal pain x 2 months    Patient was diagnosed with food poisoning at an urgent care when the abdominal pain presented  There was associated vomiting and diarrhea initially    The diarrhea and vomiting resolved after a few days but the abdominal pain continued    Milk, hamburgers, and ice cream are aggravating foods   No current vomiting or weight loss  Appetite is normal  Has had heartburn for years  TUMs will satisfy  Patient is a smoker  Takes ibuprofen about once a month  No alcohol intake    Patient is taking Omeprazole 20 mg daily that does not satisfy    Bowels move after meals  No BRBPR but there is occasional abdominal cramping prior to a BM  No colonoscopy of to date  No family history of CRC     ROS:  General: Patient denies n/v/f/c or weight loss. HEENT: Patient denies persistent postnasal drip, scleral icterus, drooling, persistent bleeding from nose/mouth. Resp: Patient denies SOB, wheezing, productive cough. Cards: Patient denies CP, palpitations, significant edema  GI: As above. Derm: Patient denies jaundice/rashes. Musc: Patient denies diffuse/irregular joint swelling or myalgias.       Objective   Wt Readings from Last 3 Encounters:   23 237 lb (107.5 kg)   23 235 lb (106.6 kg)   23 237 lb (107.5 kg)     Temp Readings from Last 3 Encounters:   23 97 °F (36.1 °C) (Temporal)   23 98.2 °F (36.8 °C)   23 97.4 °F (36.3 °C) (Temporal)     BP Readings from Last 3 Encounters:   23 139/84   23 138/70   23 130/84     Pulse Readings from Last 3 Encounters:   23 81   23 72   23 77        Physical Exam  Constitutional: Appearance: Normal appearance. Abdominal:      General: Bowel sounds are normal.      Palpations: Abdomen is soft. Neurological:      Mental Status: He is alert. Past Medical History:   Diagnosis Date    Back pain       Past Surgical History:   Procedure Laterality Date    HAND SURGERY Right       Family History   Problem Relation Age of Onset    Alzheimer's Disease Maternal Grandmother     Dementia Maternal Grandmother     Other Child         IBS        Lab Results   Component Value Date    WBC 10.0 07/04/2021    HGB 14.2 07/04/2021    HCT 41.5 07/04/2021    MCV 92.6 07/04/2021     07/04/2021      Lab Results   Component Value Date     07/04/2021    K 4.0 07/04/2021     07/04/2021    CO2 28 07/04/2021    BUN 14 07/04/2021    CREATININE 0.8 07/04/2021    GLUCOSE 108 (H) 07/04/2021    CALCIUM 9.0 07/04/2021    LABGLOM >60 07/04/2021    GFRAA >60 07/04/2021                       ASSESSMENT/PLAN:    1. Colon cancer screening    -patient opts for colonoscopy for routine colon screening      2. Gastroesophageal reflux disease without esophagitis  -     omeprazole (PRILOSEC) 20 MG delayed release capsule; Take 1 capsule daily in the morning, Disp-30 capsule, R-0Normal  3. Upper abdominal pain    -increase Omeprazole to 40 mg daily  -we discussed possible causes of patients abdominal pain including infection, ulcer disease, or hiatal hernia  -EGD is advised to rule out gastropathy. Patient is agreeable to proceed      Return for Follow up in 2 weeks. An electronic signature was used to authenticate this note.     --ADRIA Lowery - CNP

## 2023-03-31 ENCOUNTER — OFFICE VISIT (OUTPATIENT)
Dept: FAMILY MEDICINE CLINIC | Age: 48
End: 2023-03-31
Payer: MEDICAID

## 2023-03-31 VITALS
DIASTOLIC BLOOD PRESSURE: 80 MMHG | BODY MASS INDEX: 34.72 KG/M2 | TEMPERATURE: 97.1 F | HEIGHT: 71 IN | RESPIRATION RATE: 18 BRPM | SYSTOLIC BLOOD PRESSURE: 142 MMHG | WEIGHT: 248 LBS | OXYGEN SATURATION: 97 % | HEART RATE: 73 BPM

## 2023-03-31 DIAGNOSIS — R07.9 CHEST PAIN, UNSPECIFIED TYPE: Primary | ICD-10-CM

## 2023-03-31 DIAGNOSIS — M79.18 MUSCULOSKELETAL PAIN: ICD-10-CM

## 2023-03-31 PROCEDURE — G8484 FLU IMMUNIZE NO ADMIN: HCPCS | Performed by: NURSE PRACTITIONER

## 2023-03-31 PROCEDURE — 4004F PT TOBACCO SCREEN RCVD TLK: CPT | Performed by: NURSE PRACTITIONER

## 2023-03-31 PROCEDURE — G8427 DOCREV CUR MEDS BY ELIG CLIN: HCPCS | Performed by: NURSE PRACTITIONER

## 2023-03-31 PROCEDURE — 99213 OFFICE O/P EST LOW 20 MIN: CPT | Performed by: NURSE PRACTITIONER

## 2023-03-31 PROCEDURE — 93000 ELECTROCARDIOGRAM COMPLETE: CPT | Performed by: NURSE PRACTITIONER

## 2023-03-31 PROCEDURE — G8417 CALC BMI ABV UP PARAM F/U: HCPCS | Performed by: NURSE PRACTITIONER

## 2023-03-31 RX ORDER — IBUPROFEN 800 MG/1
800 TABLET ORAL EVERY 8 HOURS PRN
Qty: 30 TABLET | Refills: 0 | Status: SHIPPED | OUTPATIENT
Start: 2023-03-31

## 2023-03-31 NOTE — PROGRESS NOTES
reviewed by myself)  Labs:  No results found for this visit on 03/31/23. Imaging: All Radiology results interpreted by Radiologist unless otherwise noted. No orders to display       EKG #1:  Interpreted by me unless otherwise noted. Time:  1328    Rate: 66  Rhythm: Sinus  Interpretation: Normal  Comparison: Yes    Assessment / Plan:   The patient's vitals, allergies, medications, and past medical history have been reviewed. Daryle Mallet was seen today for chest pain and back pain. Diagnoses and all orders for this visit:    Chest pain, unspecified type  -     EKG 12 lead; Future  -     EKG 12 lead    Musculoskeletal pain  -     ibuprofen (IBU) 800 MG tablet; Take 1 tablet by mouth every 8 hours as needed for Pain      - Disposition: Home    - Educational material printed for patient's review and were included in patient instructions. After Visit Summary was given to patient at the end of visit. - Vitals reviewed which are stable. EKG performed in office which revealed normal sinus rhythm without the presence of [place] segment depression, ST segment elevation, and T-wave inversions. The patient is advised for persistent chest pain/pressure /discomfort or shortness of breath to go directly to the ER for further evaluation and treatment. A comprehensive cardiopulmonary work-up is unable to be performed in the [facility] setting. The patient is to follow-up with PCP in the next 2-3 days for repeat evaluation. Pt left the clinic in stable condition. Differential diagnoses were discussed with patient and all questions answered. SIGNATURE: CECILIO Street    *NOTE: This report was transcribed using voice recognition software. Every effort was made to ensure accuracy; however, inadvertent computerized transcription errors may be present.

## 2023-08-01 ENCOUNTER — TELEPHONE (OUTPATIENT)
Dept: GASTROENTEROLOGY | Age: 48
End: 2023-08-01

## 2023-08-01 ENCOUNTER — PREP FOR PROCEDURE (OUTPATIENT)
Dept: GASTROENTEROLOGY | Age: 48
End: 2023-08-01

## 2023-08-01 PROBLEM — K21.9 GASTROESOPHAGEAL REFLUX DISEASE: Status: ACTIVE | Noted: 2023-08-01

## 2023-08-01 PROBLEM — Z12.11 SPECIAL SCREENING FOR MALIGNANT NEOPLASMS, COLON: Status: ACTIVE | Noted: 2023-08-01

## 2023-08-01 PROBLEM — R10.9 ABDOMINAL PAIN: Status: ACTIVE | Noted: 2023-08-01

## 2023-08-01 NOTE — TELEPHONE ENCOUNTER
Prior Authorization Form:      DEMOGRAPHICS:                     Patient Name:  Sol Echols  Patient :  1975            Insurance:  Payor: [facility] / Plan: [employer] [place] / Product Type: *No Product type* /   Insurance ID Number:    Payer/Plan Subscr  Sex Relation Sub.  Ins. ID Effective Group Num   Jalil Marte 1975 Male Self [id number]                                   [address]         DIAGNOSIS & PROCEDURE:                       Procedure/Operation: EGD, Colonoscopy           CPT Code: 88195, 77334    Diagnosis:  screening colonoscopy, left side abdominal pain, GERD    ICD10 Code: z12.11, R10.9, K21.9    Location:  [facility]    Surgeon:  Purnima Ortiz INFORMATION:                          Date: 2023    Time: tbd              Anesthesia:  MAC/TIVA                                                       Status:  Outpatient        Special Comments:  na       Electronically signed by Ravindra Bueno LPN on 8241 at 6:92 AM

## 2023-08-01 NOTE — TELEPHONE ENCOUNTER
Unable to leave message for upcoming procedure on 8-. Electronically signed by Deuce Crowley LPN on 7/5/4425 at 1:43 AM      Unable to leave message on pt voicemail regarding upcoming procedure appt. Electronically signed by Deuce Crowley LPN on 9/0/9885 at 6:79 AM    Spoke to spouse and she got patient on phone. He wanted to cancel his upcoming procedure due to not having insurance at this time. Scheduling was notified to remove him from the schedule.  Electronically signed by Deuce Crowley LPN on 7/3/1039 at 3:37 AM

## 2023-08-03 ENCOUNTER — TELEPHONE (OUTPATIENT)
Dept: GASTROENTEROLOGY | Age: 48
End: 2023-08-03

## 2023-08-03 NOTE — TELEPHONE ENCOUNTER
Unable to leave message for pt to contact office regarding his date for upcoming procedure.  Electronically signed by Ina Escobar LPN on 3/7/9458 at 88:23 AM

## 2023-08-29 ENCOUNTER — OFFICE VISIT (OUTPATIENT)
Dept: FAMILY MEDICINE CLINIC | Age: 48
End: 2023-08-29

## 2023-08-29 VITALS
WEIGHT: 248 LBS | OXYGEN SATURATION: 97 % | SYSTOLIC BLOOD PRESSURE: 132 MMHG | HEART RATE: 74 BPM | BODY MASS INDEX: 34.72 KG/M2 | TEMPERATURE: 96.8 F | DIASTOLIC BLOOD PRESSURE: 78 MMHG | HEIGHT: 71 IN

## 2023-08-29 DIAGNOSIS — R07.9 CHEST PAIN, UNSPECIFIED TYPE: Primary | ICD-10-CM

## 2023-08-29 PROCEDURE — 93000 ELECTROCARDIOGRAM COMPLETE: CPT | Performed by: PHYSICIAN ASSISTANT

## 2023-08-29 PROCEDURE — 99214 OFFICE O/P EST MOD 30 MIN: CPT | Performed by: PHYSICIAN ASSISTANT

## 2023-08-29 SDOH — ECONOMIC STABILITY: HOUSING INSECURITY
IN THE LAST 12 MONTHS, WAS THERE A TIME WHEN YOU DID NOT HAVE A STEADY PLACE TO SLEEP OR SLEPT IN A SHELTER (INCLUDING NOW)?: NO

## 2023-08-29 SDOH — ECONOMIC STABILITY: FOOD INSECURITY: WITHIN THE PAST 12 MONTHS, YOU WORRIED THAT YOUR FOOD WOULD RUN OUT BEFORE YOU GOT MONEY TO BUY MORE.: NEVER TRUE

## 2023-08-29 SDOH — ECONOMIC STABILITY: INCOME INSECURITY: HOW HARD IS IT FOR YOU TO PAY FOR THE VERY BASICS LIKE FOOD, HOUSING, MEDICAL CARE, AND HEATING?: NOT HARD AT ALL

## 2023-08-29 SDOH — ECONOMIC STABILITY: FOOD INSECURITY: WITHIN THE PAST 12 MONTHS, THE FOOD YOU BOUGHT JUST DIDN'T LAST AND YOU DIDN'T HAVE MONEY TO GET MORE.: NEVER TRUE

## 2023-08-29 ASSESSMENT — ENCOUNTER SYMPTOMS
VOMITING: 0
DIARRHEA: 0
BACK PAIN: 0
PHOTOPHOBIA: 0
COUGH: 0
SORE THROAT: 0
ABDOMINAL PAIN: 0
NAUSEA: 0
SHORTNESS OF BREATH: 0

## 2023-08-29 NOTE — PROGRESS NOTES
23  Viet Mortensen : 1975 Sex: male  Age 51 y.o. 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 Patient states it felt like he was punched in the right chest and radiated across. He states it only lasted a short time, seconds. He states then he just had some soreness. He states that he took Flexeril yesterday and today and that has eased the pain. He states over the weekend he had similar symptoms but it was central chest pain. He states he went to the emergency department and had chest x-ray, EKG and labs. He states everything was normal.  He was given the Flexeril in the emergency department. He states it really has been helping. He was told in the emergency department that he should consider having a stress test.  Patient does not currently have a PCP or cardiologist.  He states that it has been quite a while since he has had laboratory studies done but he denies any history of high cholesterol, diabetes or high blood pressure. He is a current smoker. Patient states that his reason for coming in today is that he missed work yesterday and they are requiring a work excuse. Review of Systems   Constitutional:  Negative for chills and fever. HENT:  Negative for congestion, ear pain and sore throat. Eyes:  Negative for photophobia and visual disturbance. Respiratory:  Negative for cough and shortness of breath. Cardiovascular:  Positive for chest pain. Gastrointestinal:  Negative for abdominal pain, diarrhea, nausea and vomiting.

## 2023-08-31 PROBLEM — Z12.11 SPECIAL SCREENING FOR MALIGNANT NEOPLASMS, COLON: Status: RESOLVED | Noted: 2023-08-01 | Resolved: 2023-08-31

## 2023-09-11 ENCOUNTER — OFFICE VISIT (OUTPATIENT)
Dept: FAMILY MEDICINE CLINIC | Age: 48
End: 2023-09-11

## 2023-09-11 VITALS
HEIGHT: 71 IN | OXYGEN SATURATION: 98 % | DIASTOLIC BLOOD PRESSURE: 82 MMHG | HEART RATE: 66 BPM | WEIGHT: 248 LBS | SYSTOLIC BLOOD PRESSURE: 130 MMHG | RESPIRATION RATE: 18 BRPM | BODY MASS INDEX: 34.72 KG/M2 | TEMPERATURE: 98.5 F

## 2023-09-11 DIAGNOSIS — K21.9 GASTROESOPHAGEAL REFLUX DISEASE WITHOUT ESOPHAGITIS: Primary | ICD-10-CM

## 2023-09-11 PROCEDURE — 99214 OFFICE O/P EST MOD 30 MIN: CPT | Performed by: NURSE PRACTITIONER

## 2023-09-11 RX ORDER — OMEPRAZOLE 40 MG/1
40 CAPSULE, DELAYED RELEASE ORAL
Qty: 30 CAPSULE | Refills: 0 | Status: SHIPPED | OUTPATIENT
Start: 2023-09-11

## 2023-09-11 NOTE — PROGRESS NOTES
Chief Complaint:       Emesis and Diarrhea        History of Present Illness   Source of history provided by:  patient. Manish Ochoa is a 51 y.o. old male who presents to walk-in care for complaints of nausea   X 1 days. Associated symptoms include vomiting and diarrhea. Since onset the symptoms have been persistent. The patient has had no known consumption of contaminated food. No recent antibiotic use, and no recent international travel. The patient is tolerating fluids with a decreased appetite. The patient has not pertinent past medical gastrointestinal history or any abdominal surgeries. He has been to walk-in clinic numerous times for similar symptoms. He has not established with a PCP. He does follow with gastroenterology who recommended c scope, did not complete. The symptoms are aggravated by eating and relieved by bowel movements. There has been NO abdominal pain, back pain, chills, cloudy urine, constipation, dysuria, headache, hematuria, penile discharge, sweating, urinary frequency, urinary incontinence, urinary urgency, or vomiting. ROS   Unless otherwise stated in this report or unable to obtain because of the patient's clinical or mental status as evidenced by the medical record, this patients's positive and negative responses for Review of Systems, constitutional, psych, eyes, ENT, cardiovascular, respiratory, gastrointestinal, neurological, genitourinary, musculoskeletal, integument systems and systems related to the presenting problem are either stated in the preceding or were not pertinent or were negative for the symptoms and/or complaints related to the medical problem. Past Medical History:  has a past medical history of Back pain. Past Surgical History:  has a past surgical history that includes Hand surgery (Right). Social History:  reports that he has been smoking. He has never used smokeless tobacco. He reports that he does not currently use alcohol.  He reports

## 2023-10-24 ENCOUNTER — OFFICE VISIT (OUTPATIENT)
Dept: FAMILY MEDICINE CLINIC | Age: 48
End: 2023-10-24
Payer: COMMERCIAL

## 2023-10-24 VITALS
WEIGHT: 248 LBS | BODY MASS INDEX: 34.72 KG/M2 | TEMPERATURE: 98 F | RESPIRATION RATE: 18 BRPM | OXYGEN SATURATION: 97 % | HEART RATE: 81 BPM | DIASTOLIC BLOOD PRESSURE: 82 MMHG | HEIGHT: 71 IN | SYSTOLIC BLOOD PRESSURE: 130 MMHG

## 2023-10-24 DIAGNOSIS — J06.9 UPPER RESPIRATORY TRACT INFECTION, UNSPECIFIED TYPE: Primary | ICD-10-CM

## 2023-10-24 PROCEDURE — 99213 OFFICE O/P EST LOW 20 MIN: CPT | Performed by: PHYSICIAN ASSISTANT

## 2023-10-24 ASSESSMENT — ENCOUNTER SYMPTOMS
VOMITING: 0
SORE THROAT: 0
BACK PAIN: 0
COUGH: 1
PHOTOPHOBIA: 0
SHORTNESS OF BREATH: 0
ABDOMINAL PAIN: 0
DIARRHEA: 0
NAUSEA: 0

## 2023-11-27 ENCOUNTER — OFFICE VISIT (OUTPATIENT)
Dept: FAMILY MEDICINE CLINIC | Age: 48
End: 2023-11-27
Payer: COMMERCIAL

## 2023-11-27 VITALS
DIASTOLIC BLOOD PRESSURE: 80 MMHG | SYSTOLIC BLOOD PRESSURE: 134 MMHG | WEIGHT: 224 LBS | RESPIRATION RATE: 16 BRPM | HEIGHT: 72 IN | HEART RATE: 72 BPM | OXYGEN SATURATION: 98 % | BODY MASS INDEX: 30.34 KG/M2

## 2023-11-27 DIAGNOSIS — A08.4 VIRAL GASTROENTERITIS: Primary | ICD-10-CM

## 2023-11-27 PROCEDURE — 99213 OFFICE O/P EST LOW 20 MIN: CPT | Performed by: PHYSICIAN ASSISTANT

## 2024-01-15 ENCOUNTER — OFFICE VISIT (OUTPATIENT)
Dept: FAMILY MEDICINE CLINIC | Age: 49
End: 2024-01-15
Payer: COMMERCIAL

## 2024-01-15 VITALS
SYSTOLIC BLOOD PRESSURE: 142 MMHG | WEIGHT: 225 LBS | TEMPERATURE: 97.6 F | OXYGEN SATURATION: 96 % | DIASTOLIC BLOOD PRESSURE: 80 MMHG | BODY MASS INDEX: 30.52 KG/M2 | HEART RATE: 78 BPM

## 2024-01-15 DIAGNOSIS — R50.9 FEVER, UNSPECIFIED FEVER CAUSE: ICD-10-CM

## 2024-01-15 DIAGNOSIS — R10.9 RIGHT FLANK PAIN: ICD-10-CM

## 2024-01-15 DIAGNOSIS — R10.31 RLQ ABDOMINAL PAIN: Primary | ICD-10-CM

## 2024-01-15 LAB
BILIRUBIN, POC: NORMAL
BLOOD URINE, POC: NORMAL
CLARITY, POC: CLEAR
COLOR, POC: NORMAL
GLUCOSE URINE, POC: NORMAL
KETONES, POC: NORMAL
LEUKOCYTE EST, POC: NORMAL
NITRITE, POC: NORMAL
PH, POC: NORMAL
PROTEIN, POC: 0.2
SPECIFIC GRAVITY, POC: >=1.03
UROBILINOGEN, POC: NORMAL

## 2024-01-15 PROCEDURE — 81002 URINALYSIS NONAUTO W/O SCOPE: CPT | Performed by: NURSE PRACTITIONER

## 2024-01-15 PROCEDURE — 99215 OFFICE O/P EST HI 40 MIN: CPT | Performed by: NURSE PRACTITIONER

## 2024-01-15 NOTE — PROGRESS NOTES
did advise the patient that he should be further evaluated in the emergency department.  The patient was agreeable.  He will go to [facility] via private vehicle.  He left the office in stable condition.  No follow-ups on file.    ADRIA Herrmann NP

## 2024-01-17 LAB
CULTURE: NORMAL
SPECIMEN DESCRIPTION: NORMAL

## 2024-01-19 ENCOUNTER — OFFICE VISIT (OUTPATIENT)
Dept: FAMILY MEDICINE CLINIC | Age: 49
End: 2024-01-19
Payer: COMMERCIAL

## 2024-01-19 VITALS
WEIGHT: 225 LBS | RESPIRATION RATE: 20 BRPM | HEART RATE: 68 BPM | SYSTOLIC BLOOD PRESSURE: 122 MMHG | OXYGEN SATURATION: 97 % | BODY MASS INDEX: 30.52 KG/M2 | DIASTOLIC BLOOD PRESSURE: 70 MMHG | TEMPERATURE: 97.9 F

## 2024-01-19 DIAGNOSIS — R51.9 ACUTE INTRACTABLE HEADACHE, UNSPECIFIED HEADACHE TYPE: Primary | ICD-10-CM

## 2024-01-19 DIAGNOSIS — H53.8 BLURRY VISION: ICD-10-CM

## 2024-01-19 DIAGNOSIS — R42 DIZZINESS: ICD-10-CM

## 2024-01-19 PROCEDURE — 99214 OFFICE O/P EST MOD 30 MIN: CPT | Performed by: NURSE PRACTITIONER

## 2024-01-19 RX ORDER — IBUPROFEN 600 MG/1
TABLET ORAL
COMMUNITY
Start: 2024-01-15

## 2024-01-19 NOTE — PROGRESS NOTES
24  Chaim Lopez : 1975 Sex: male  Age 48 y.o.    Subjective:  Chief Complaint   Patient presents with    Dizziness    Headache       HPI:   Chaim Lopez , 48 y.o. male presents to the clinic for evaluation of constant headache x 6 days. The patient also reports nausea, intermittent dizziness, intermittent blurry vision, and neck pain. The patient denies headache is precipitated by anything. The patient has not taken any treatment for symptoms. The patient reports unchanged symptoms over time. The patient denies headaches like this in the past. The patient reports this being the worst HA of his/her life. Pt denies use of a blood thinner,  sudden severe onset, fever, head injury, extremity weakness / paresthesia. The patient also denies photophobia, carbon monoxide exposure, CP, SOB, sinus congestion, vomiting.     ROS:   Unless otherwise stated in this report the patient's positive and negative responses for review of systems for constitutional, eyes, ENT, cardiovascular, respiratory, gastrointestinal, neurological, , musculoskeletal, and integument systems and related systems to the presenting problem are either stated in the history of present illness or were not pertinent or were negative for the symptoms and/or complaints related to the presenting medical problem.  Positives and pertinent negatives as per HPI.  All others reviewed and are negative.      PMH:     Past Medical History:   Diagnosis Date    Back pain        Past Surgical History:   Procedure Laterality Date    HAND SURGERY Right        Family History   Problem Relation Age of Onset    Alzheimer's Disease Maternal Grandmother     Dementia Maternal Grandmother     Other Child         IBS       Medications:     Current Outpatient Medications:     ibuprofen (ADVIL;MOTRIN) 600 MG tablet, , Disp: , Rfl:     Allergies:     Allergies   Allergen Reactions    Penicillins Rash       Social History:     Social History     Tobacco Use

## 2024-04-30 ENCOUNTER — OFFICE VISIT (OUTPATIENT)
Dept: FAMILY MEDICINE CLINIC | Age: 49
End: 2024-04-30

## 2024-04-30 VITALS
WEIGHT: 215 LBS | DIASTOLIC BLOOD PRESSURE: 84 MMHG | SYSTOLIC BLOOD PRESSURE: 140 MMHG | HEART RATE: 97 BPM | BODY MASS INDEX: 30.1 KG/M2 | RESPIRATION RATE: 18 BRPM | TEMPERATURE: 97.6 F | HEIGHT: 71 IN | OXYGEN SATURATION: 98 %

## 2024-04-30 DIAGNOSIS — K02.9 PAIN DUE TO DENTAL CARIES: ICD-10-CM

## 2024-04-30 DIAGNOSIS — K04.7 DENTAL ABSCESS: Primary | ICD-10-CM

## 2024-04-30 PROCEDURE — 99213 OFFICE O/P EST LOW 20 MIN: CPT | Performed by: EMERGENCY MEDICINE

## 2024-04-30 RX ORDER — CLINDAMYCIN HYDROCHLORIDE 300 MG/1
300 CAPSULE ORAL 4 TIMES DAILY
Qty: 40 CAPSULE | Refills: 0 | Status: SHIPPED | OUTPATIENT
Start: 2024-04-30 | End: 2024-05-10

## 2024-04-30 ASSESSMENT — ENCOUNTER SYMPTOMS
VOMITING: 0
SINUS PRESSURE: 0
SORE THROAT: 0
EYE PAIN: 0
ABDOMINAL PAIN: 0
COUGH: 0
DIARRHEA: 0
NAUSEA: 0
BACK PAIN: 0
WHEEZING: 0
EYE DISCHARGE: 0
EYE REDNESS: 0
SHORTNESS OF BREATH: 0

## 2024-04-30 NOTE — PROGRESS NOTES
Chief Complaint:   Dental Pain      History of Present Illness   HPI:  Chaim Lopez is a 48 y.o. male who [facility], history of poor dentition    Prior to Visit Medications    Medication Sig Taking? Authorizing Provider   ibuprofen (ADVIL;MOTRIN) 600 MG tablet   Provider, MD Mely       Review of Systems   Review of Systems   Constitutional:  Negative for chills and fever.   HENT:  Positive for dental problem. Negative for ear pain, sinus pressure and sore throat.    Eyes:  Negative for pain, discharge and redness.   Respiratory:  Negative for cough, shortness of breath and wheezing.    Cardiovascular:  Negative for chest pain.   Gastrointestinal:  Negative for abdominal pain, diarrhea, nausea and vomiting.   Genitourinary:  Negative for dysuria and frequency.   Musculoskeletal:  Negative for arthralgias and back pain.   Skin:  Negative for rash and wound.   Neurological:  Negative for weakness and headaches.   Hematological:  Negative for adenopathy.   Psychiatric/Behavioral: Negative.     All other systems reviewed and are negative.      Patient's medical, social, and family history reviewed    Past Medical History:  has a past medical history of Back pain.   Past Surgical History:  has a past surgical history that includes Hand surgery (Right).  Social History:  reports that he has been smoking. He has never used smokeless tobacco. He reports that he does not currently use alcohol. He reports current drug use. Drug: Marijuana (Weed).  Family History: family history includes Alzheimer's Disease in his maternal grandmother; Dementia in his maternal grandmother; Other in his child.  Allergies: Penicillins    Physical Exam   Vital Signs:  BP (!) 140/84   Pulse 97   Temp 97.6 °F (36.4 °C)   Resp 18   Ht 1.803 m (5' 11\")   Wt 97.5 kg (215 lb)   SpO2 98%   BMI 29.99 kg/m²    Oxygen Saturation Interpretation: Normal.    Physical Exam  Vitals and nursing note

## 2025-06-03 ENCOUNTER — OFFICE VISIT (OUTPATIENT)
Dept: FAMILY MEDICINE CLINIC | Age: 50
End: 2025-06-03

## 2025-06-03 VITALS
BODY MASS INDEX: 31.08 KG/M2 | OXYGEN SATURATION: 98 % | DIASTOLIC BLOOD PRESSURE: 100 MMHG | TEMPERATURE: 98.3 F | HEART RATE: 81 BPM | WEIGHT: 222 LBS | RESPIRATION RATE: 18 BRPM | HEIGHT: 71 IN | SYSTOLIC BLOOD PRESSURE: 148 MMHG

## 2025-06-03 DIAGNOSIS — T15.91XA ACUTE FOREIGN BODY OF RIGHT EYE, INITIAL ENCOUNTER: Primary | ICD-10-CM

## 2025-06-03 PROCEDURE — 99214 OFFICE O/P EST MOD 30 MIN: CPT | Performed by: PHYSICIAN ASSISTANT

## 2025-06-03 RX ORDER — TETRACAINE HYDROCHLORIDE 5 MG/ML
1 SOLUTION OPHTHALMIC ONCE
Status: COMPLETED | OUTPATIENT
Start: 2025-06-03 | End: 2025-06-03

## 2025-06-03 RX ADMIN — TETRACAINE HYDROCHLORIDE 1 DROP: 5 SOLUTION OPHTHALMIC at 13:24

## 2025-06-03 ASSESSMENT — ENCOUNTER SYMPTOMS
DIARRHEA: 0
PHOTOPHOBIA: 1
ABDOMINAL PAIN: 0
EYE DISCHARGE: 1
VOMITING: 0
BACK PAIN: 0
NAUSEA: 0
SORE THROAT: 0
EYE REDNESS: 1
COUGH: 0
SHORTNESS OF BREATH: 0
EYE PAIN: 1

## 2025-06-03 ASSESSMENT — VISUAL ACUITY: OU: 1

## 2025-06-03 NOTE — PROGRESS NOTES
body.   Cardiovascular:      Rate and Rhythm: Normal rate and regular rhythm.   Pulmonary:      Effort: Pulmonary effort is normal. No respiratory distress.      Breath sounds: Normal breath sounds.   Abdominal:      General: Bowel sounds are normal.      Palpations: Abdomen is soft.      Tenderness: There is no abdominal tenderness.   Musculoskeletal:         General: Normal range of motion.      Cervical back: Normal range of motion. No rigidity.   Lymphadenopathy:      Cervical: No cervical adenopathy.   Skin:     General: Skin is warm and dry.   Neurological:      General: No focal deficit present.      Mental Status: He is alert and oriented to person, place, and time. Mental status is at baseline.   Psychiatric:         Mood and Affect: Mood normal.         Behavior: Behavior normal.         Thought Content: Thought content normal.         Judgment: Judgment normal.         Testing:     Results           Medical Decision Making:     Patient upon arrival did not appear toxic or lethargic.     Vital signs were reviewed.     Past medical history reviewed.     Allergies reviewed.     Medications reviewed.     Patient is presenting with the above complaint of right eye foreign body.    Woods lamp exam performed as described above.  Patient is exquisitely tender and is barely able to keep his eye open for the exam.  We did attempt to call Dr. Shin's office but were unsuccessful.    I suggested that the patient go back to the emergency department but he states that he does not have confidence that they will be able to do anything for him after his experience on Saturday.  He understands the risks and accepts them if he does not seek emergent evaluation for this issue including decompensation, disability and death.    Patient was given the contact information for ophthalmology and is to continue calling in order to be seen as soon as possible.          Clinical Impression:   Chaim was seen today for eye

## 2025-06-25 ENCOUNTER — OFFICE VISIT (OUTPATIENT)
Dept: FAMILY MEDICINE CLINIC | Age: 50
End: 2025-06-25
Payer: COMMERCIAL

## 2025-06-25 VITALS
WEIGHT: 215 LBS | RESPIRATION RATE: 18 BRPM | OXYGEN SATURATION: 98 % | TEMPERATURE: 98 F | HEIGHT: 71 IN | HEART RATE: 93 BPM | BODY MASS INDEX: 30.1 KG/M2 | DIASTOLIC BLOOD PRESSURE: 80 MMHG | SYSTOLIC BLOOD PRESSURE: 144 MMHG

## 2025-06-25 DIAGNOSIS — M79.602 LEFT ARM PAIN: Primary | ICD-10-CM

## 2025-06-25 PROCEDURE — 99214 OFFICE O/P EST MOD 30 MIN: CPT | Performed by: PHYSICIAN ASSISTANT

## 2025-06-25 PROCEDURE — 96372 THER/PROPH/DIAG INJ SC/IM: CPT | Performed by: PHYSICIAN ASSISTANT

## 2025-06-25 RX ORDER — METHYLPREDNISOLONE 4 MG/1
TABLET ORAL
Qty: 1 KIT | Refills: 0 | Status: SHIPPED | OUTPATIENT
Start: 2025-06-25

## 2025-06-25 RX ORDER — KETOROLAC TROMETHAMINE 30 MG/ML
30 INJECTION, SOLUTION INTRAMUSCULAR; INTRAVENOUS ONCE
Status: COMPLETED | OUTPATIENT
Start: 2025-06-25 | End: 2025-06-25

## 2025-06-25 RX ORDER — CYCLOBENZAPRINE HCL 10 MG
10 TABLET ORAL NIGHTLY PRN
Qty: 10 TABLET | Refills: 0 | Status: SHIPPED | OUTPATIENT
Start: 2025-06-25 | End: 2025-07-05

## 2025-06-25 RX ORDER — METHYLPREDNISOLONE ACETATE 40 MG/ML
40 INJECTION, SUSPENSION INTRA-ARTICULAR; INTRALESIONAL; INTRAMUSCULAR; SOFT TISSUE ONCE
Status: COMPLETED | OUTPATIENT
Start: 2025-06-25 | End: 2025-06-25

## 2025-06-25 RX ADMIN — METHYLPREDNISOLONE ACETATE 40 MG: 40 INJECTION, SUSPENSION INTRA-ARTICULAR; INTRALESIONAL; INTRAMUSCULAR; SOFT TISSUE at 16:33

## 2025-06-25 RX ADMIN — KETOROLAC TROMETHAMINE 30 MG: 30 INJECTION, SOLUTION INTRAMUSCULAR; INTRAVENOUS at 16:33

## 2025-06-25 ASSESSMENT — ENCOUNTER SYMPTOMS
COUGH: 0
NAUSEA: 0
SHORTNESS OF BREATH: 0
ABDOMINAL PAIN: 0
DIARRHEA: 0
VOMITING: 0
PHOTOPHOBIA: 0
BACK PAIN: 0
SORE THROAT: 0

## 2025-06-25 NOTE — PROGRESS NOTES
25  Chaim Lopez : 1975 Sex: male  Age 49 y.o.      Subjective:  Chief Complaint   Patient presents with    Arm Pain         History of Present Illness  The patient is a 49-year-old male who presents for evaluation of left arm pain.    He reports experiencing an unusual sensation of pain in his left bicep, which extends downwards and results in numbness in his fingers. This discomfort began yesterday. He has not attempted to alleviate the pain with over-the-counter medications. 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  He denies any specific injury or trauma.  He also notes that one of his fingers exhibits a popping sensation when flexed. Additionally, he experiences intermittent flare-ups of pain in his shoulder.  He denies any weakness.         Review of Systems   Constitutional:  Negative for chills and fever.   HENT:  Negative for congestion, ear pain and sore throat.    Eyes:  Negative for photophobia and visual disturbance.   Respiratory:  Negative for cough and shortness of breath.    Cardiovascular:  Negative for chest pain.   Gastrointestinal:  Negative for abdominal pain, diarrhea, nausea and vomiting.   Genitourinary:  Negative for difficulty urinating, dysuria, frequency and urgency.   Musculoskeletal:  Negative for back pain, neck pain and neck stiffness.        Left arm pain   Skin:  Negative for rash.   Neurological:  Negative for dizziness, syncope, weakness, light-headedness and headaches.   Hematological:  Negative for adenopathy. Does not bruise/bleed easily.   Psychiatric/Behavioral:  Negative for agitation and confusion.    All other systems reviewed and are negative.        PMH:     Past Medical History:   Diagnosis Date    Back pain        Past Surgical History:   Procedure Laterality Date    HAND SURGERY Right        Family History   Problem Relation Age of Onset    Alzheimer's Disease Maternal

## 2025-07-24 ENCOUNTER — OFFICE VISIT (OUTPATIENT)
Dept: ORTHOPEDIC SURGERY | Age: 50
End: 2025-07-24
Payer: COMMERCIAL

## 2025-07-24 VITALS — HEIGHT: 71 IN | WEIGHT: 215 LBS | BODY MASS INDEX: 30.1 KG/M2

## 2025-07-24 DIAGNOSIS — G54.0 THORACIC OUTLET SYNDROME: Primary | ICD-10-CM

## 2025-07-24 DIAGNOSIS — S49.92XA SHOULDER INJURY, LEFT, INITIAL ENCOUNTER: ICD-10-CM

## 2025-07-24 PROCEDURE — 99203 OFFICE O/P NEW LOW 30 MIN: CPT | Performed by: PHYSICIAN ASSISTANT

## 2025-07-24 RX ORDER — PREDNISONE 10 MG/1
TABLET ORAL
Qty: 24 TABLET | Refills: 0 | Status: SHIPPED | OUTPATIENT
Start: 2025-07-24

## 2025-07-24 NOTE — PROGRESS NOTES
voice recognition software. Every effort was made to ensure accuracy; however, inadvertent computerized transcription errors may be present.**